# Patient Record
Sex: FEMALE | Race: WHITE | Employment: UNEMPLOYED | URBAN - METROPOLITAN AREA
[De-identification: names, ages, dates, MRNs, and addresses within clinical notes are randomized per-mention and may not be internally consistent; named-entity substitution may affect disease eponyms.]

---

## 2017-03-15 ENCOUNTER — OFFICE VISIT (OUTPATIENT)
Dept: FAMILY MEDICINE CLINIC | Facility: CLINIC | Age: 52
End: 2017-03-15

## 2017-03-15 VITALS
HEIGHT: 64 IN | OXYGEN SATURATION: 98 % | BODY MASS INDEX: 29.02 KG/M2 | DIASTOLIC BLOOD PRESSURE: 84 MMHG | SYSTOLIC BLOOD PRESSURE: 124 MMHG | HEART RATE: 83 BPM | WEIGHT: 170 LBS | RESPIRATION RATE: 16 BRPM | TEMPERATURE: 98 F

## 2017-03-15 DIAGNOSIS — J01.00 ACUTE MAXILLARY SINUSITIS, RECURRENCE NOT SPECIFIED: Primary | ICD-10-CM

## 2017-03-15 DIAGNOSIS — R05.9 COUGH: ICD-10-CM

## 2017-03-15 PROCEDURE — 99213 OFFICE O/P EST LOW 20 MIN: CPT | Performed by: PHYSICIAN ASSISTANT

## 2017-03-15 RX ORDER — BENZONATATE 200 MG/1
200 CAPSULE ORAL 3 TIMES DAILY PRN
Qty: 30 CAPSULE | Refills: 0 | Status: SHIPPED | OUTPATIENT
Start: 2017-03-15 | End: 2017-05-30

## 2017-03-15 RX ORDER — CHOLECALCIFEROL (VITAMIN D3) 125 MCG
500 CAPSULE ORAL DAILY
COMMUNITY
End: 2017-05-30

## 2017-03-15 RX ORDER — CODEINE PHOSPHATE AND GUAIFENESIN 10; 100 MG/5ML; MG/5ML
5 SOLUTION ORAL EVERY 6 HOURS PRN
Qty: 120 ML | Refills: 0 | Status: SHIPPED | OUTPATIENT
Start: 2017-03-15 | End: 2017-05-30

## 2017-03-15 RX ORDER — AMOXICILLIN AND CLAVULANATE POTASSIUM 875; 125 MG/1; MG/1
1 TABLET, FILM COATED ORAL 2 TIMES DAILY
Qty: 20 TABLET | Refills: 0 | Status: SHIPPED | OUTPATIENT
Start: 2017-03-15 | End: 2017-03-25

## 2017-03-15 RX ORDER — FLUTICASONE PROPIONATE 50 MCG
1 SPRAY, SUSPENSION (ML) NASAL DAILY
Qty: 1 BOTTLE | Refills: 1 | Status: SHIPPED | OUTPATIENT
Start: 2017-03-15 | End: 2017-04-14

## 2017-03-15 NOTE — PROGRESS NOTES
HPI:   Camille Mendez is a 47year old female who presents for sinus symptoms for  2  months.  Patient reports congestion, green and yellow, sometimes blood tinged colored nasal discharge, dry cough, cough with green colored sputum, ear pain, sinus ariel CTA, easy breathing, + dry cough  CV: normal S1S2, RRR without murmur     ASSESSMENT AND PLAN:   1. Acute maxillary sinusitis, recurrence not specified  Augmentin twice daily for 10 days. Take with probiotic. Flonase nasal spray daily as directed.  Pt educ

## 2017-05-30 ENCOUNTER — OFFICE VISIT (OUTPATIENT)
Dept: SURGERY | Facility: CLINIC | Age: 52
End: 2017-05-30

## 2017-05-30 VITALS
HEART RATE: 67 BPM | BODY MASS INDEX: 29 KG/M2 | DIASTOLIC BLOOD PRESSURE: 88 MMHG | RESPIRATION RATE: 16 BRPM | WEIGHT: 170 LBS | SYSTOLIC BLOOD PRESSURE: 136 MMHG | TEMPERATURE: 97 F

## 2017-05-30 DIAGNOSIS — R92.1 BREAST CALCIFICATIONS: Primary | ICD-10-CM

## 2017-05-30 PROCEDURE — 99244 OFF/OP CNSLTJ NEW/EST MOD 40: CPT | Performed by: SURGERY

## 2017-05-31 ENCOUNTER — APPOINTMENT (OUTPATIENT)
Dept: LAB | Age: 52
End: 2017-05-31
Attending: INTERNAL MEDICINE
Payer: COMMERCIAL

## 2017-05-31 DIAGNOSIS — R74.8 ELEVATED ALKALINE PHOSPHATASE LEVEL: ICD-10-CM

## 2017-05-31 PROCEDURE — 84075 ASSAY ALKALINE PHOSPHATASE: CPT

## 2017-05-31 PROCEDURE — 82977 ASSAY OF GGT: CPT

## 2017-05-31 PROCEDURE — 36415 COLL VENOUS BLD VENIPUNCTURE: CPT

## 2017-06-05 ENCOUNTER — OFFICE VISIT (OUTPATIENT)
Dept: FAMILY MEDICINE CLINIC | Facility: CLINIC | Age: 52
End: 2017-06-05

## 2017-06-05 VITALS
DIASTOLIC BLOOD PRESSURE: 70 MMHG | RESPIRATION RATE: 16 BRPM | BODY MASS INDEX: 29.18 KG/M2 | HEIGHT: 64.5 IN | TEMPERATURE: 99 F | WEIGHT: 173 LBS | HEART RATE: 74 BPM | OXYGEN SATURATION: 98 % | SYSTOLIC BLOOD PRESSURE: 130 MMHG

## 2017-06-05 DIAGNOSIS — N92.1 IRREGULAR INTERMENSTRUAL BLEEDING: Primary | ICD-10-CM

## 2017-06-05 DIAGNOSIS — R63.5 WEIGHT GAIN: ICD-10-CM

## 2017-06-05 DIAGNOSIS — B37.0 THRUSH: ICD-10-CM

## 2017-06-05 PROCEDURE — 88175 CYTOPATH C/V AUTO FLUID REDO: CPT | Performed by: INTERNAL MEDICINE

## 2017-06-05 PROCEDURE — 87624 HPV HI-RISK TYP POOLED RSLT: CPT | Performed by: INTERNAL MEDICINE

## 2017-06-05 PROCEDURE — 99214 OFFICE O/P EST MOD 30 MIN: CPT | Performed by: INTERNAL MEDICINE

## 2017-06-05 RX ORDER — MEDROXYPROGESTERONE ACETATE 10 MG/1
10 TABLET ORAL DAILY
Qty: 10 TABLET | Refills: 0 | Status: SHIPPED | OUTPATIENT
Start: 2017-06-05 | End: 2017-06-12

## 2017-06-05 RX ORDER — FLUCONAZOLE 100 MG/1
100 TABLET ORAL DAILY
Qty: 10 TABLET | Refills: 0 | Status: SHIPPED | OUTPATIENT
Start: 2017-06-05 | End: 2017-08-22 | Stop reason: ALTCHOICE

## 2017-06-05 NOTE — PROGRESS NOTES
Christopher Funes is a 47year old female. HPI:   Here with spotting since last Fall 2016. Reports seeing Dr. Cristal Colorado after her [address] in 2016, but was told that nothing was concerning since she was still menstruating. Exam was not done per pt.   [facility] warm & dry, tongue with faint white coating on the borders of tongue; no patches on buccal mucosa, no inflammation or edema  NECK: supple,no adenopathy   LUNGS: CTA, easy breathing  CV: normal S1S2, RRR without murmur  GI: good BS's,no masses, HSM or tende

## 2017-06-12 ENCOUNTER — HOSPITAL ENCOUNTER (OUTPATIENT)
Dept: MAMMOGRAPHY | Facility: HOSPITAL | Age: 52
Discharge: HOME | End: 2017-06-12
Attending: SURGERY
Payer: COMMERCIAL

## 2017-06-12 DIAGNOSIS — R92.1 BREAST CALCIFICATIONS: ICD-10-CM

## 2017-06-12 PROCEDURE — 76642 ULTRASOUND BREAST LIMITED: CPT | Performed by: SURGERY

## 2017-06-12 PROCEDURE — G0279 TOMOSYNTHESIS, MAMMO: HCPCS | Performed by: SURGERY

## 2017-06-12 PROCEDURE — 77062 BREAST TOMOSYNTHESIS BI: CPT | Performed by: SURGERY

## 2017-06-12 PROCEDURE — 77066 DX MAMMO INCL CAD BI: CPT | Performed by: SURGERY

## 2017-06-12 RX ORDER — MEDROXYPROGESTERONE ACETATE 10 MG/1
TABLET ORAL
Qty: 10 TABLET | Refills: 0 | Status: SHIPPED | OUTPATIENT
Start: 2017-06-12 | End: 2017-07-10

## 2017-06-13 DIAGNOSIS — R92.1 BREAST CALCIFICATION, LEFT: Primary | ICD-10-CM

## 2017-06-22 ENCOUNTER — HOSPITAL ENCOUNTER (OUTPATIENT)
Dept: MAMMOGRAPHY | Facility: HOSPITAL | Age: 52
Discharge: HOME | End: 2017-06-22
Attending: SURGERY
Payer: COMMERCIAL

## 2017-06-22 DIAGNOSIS — R92.1 BREAST CALCIFICATION, LEFT: ICD-10-CM

## 2017-06-22 PROCEDURE — 88305 TISSUE EXAM BY PATHOLOGIST: CPT | Performed by: SURGERY

## 2017-06-22 PROCEDURE — 19081 BX BREAST 1ST LESION STRTCTC: CPT | Performed by: SURGERY

## 2017-06-22 PROCEDURE — 19082 BX BREAST ADD LESION STRTCTC: CPT | Performed by: SURGERY

## 2017-06-22 RX ORDER — DIAZEPAM 5 MG/1
TABLET ORAL
Status: COMPLETED
Start: 2017-06-22 | End: 2017-06-22

## 2017-06-22 RX ORDER — DIAZEPAM 5 MG/1
5 TABLET ORAL EVERY 6 HOURS PRN
Status: DISCONTINUED | OUTPATIENT
Start: 2017-06-22 | End: 2017-06-26

## 2017-06-22 RX ADMIN — DIAZEPAM 5 MG: 5 TABLET ORAL at 12:51:00

## 2017-06-26 ENCOUNTER — TELEPHONE (OUTPATIENT)
Dept: SURGERY | Facility: CLINIC | Age: 52
End: 2017-06-26

## 2017-06-27 ENCOUNTER — OFFICE VISIT (OUTPATIENT)
Dept: SURGERY | Facility: CLINIC | Age: 52
End: 2017-06-27

## 2017-06-27 VITALS
RESPIRATION RATE: 18 BRPM | BODY MASS INDEX: 29 KG/M2 | OXYGEN SATURATION: 94 % | DIASTOLIC BLOOD PRESSURE: 92 MMHG | SYSTOLIC BLOOD PRESSURE: 139 MMHG | WEIGHT: 169.63 LBS | TEMPERATURE: 98 F | HEART RATE: 76 BPM

## 2017-06-27 DIAGNOSIS — R92.8 ABNORMAL MAMMOGRAM OF BOTH BREASTS: Primary | ICD-10-CM

## 2017-06-27 PROCEDURE — 99214 OFFICE O/P EST MOD 30 MIN: CPT | Performed by: SURGERY

## 2017-07-10 ENCOUNTER — OFFICE VISIT (OUTPATIENT)
Dept: OBGYN CLINIC | Facility: CLINIC | Age: 52
End: 2017-07-10

## 2017-07-10 VITALS
WEIGHT: 171 LBS | SYSTOLIC BLOOD PRESSURE: 120 MMHG | HEIGHT: 64 IN | BODY MASS INDEX: 29.19 KG/M2 | HEART RATE: 88 BPM | DIASTOLIC BLOOD PRESSURE: 70 MMHG

## 2017-07-10 DIAGNOSIS — N92.6 IRREGULAR MENSES: Primary | ICD-10-CM

## 2017-07-10 PROCEDURE — 88305 TISSUE EXAM BY PATHOLOGIST: CPT | Performed by: OBSTETRICS & GYNECOLOGY

## 2017-07-10 PROCEDURE — 58100 BIOPSY OF UTERUS LINING: CPT | Performed by: OBSTETRICS & GYNECOLOGY

## 2017-07-10 NOTE — PROGRESS NOTES
Teresa Gordillo is a 47year old female. HPI:   Patient presents with:  Gyn Problem: consistant bleeding      spotting almost every day it does stop. Has never really stopped having periods.   Saw her primary care doctor with a normal Pap smear and

## 2017-07-10 NOTE — PROGRESS NOTES
Endometrial Biopsy     Pre-Procedure Care:   Consent was obtained. Procedure/risks were explained. Questions were answered. Correct patient was identified. Correct side and site were confirmed.           Indication: Constant spotting        Description

## 2017-07-13 DIAGNOSIS — N95.0 POSTMENOPAUSAL BLEEDING: Primary | ICD-10-CM

## 2017-07-21 ENCOUNTER — TELEPHONE (OUTPATIENT)
Dept: FAMILY MEDICINE CLINIC | Facility: CLINIC | Age: 52
End: 2017-07-21

## 2017-07-21 NOTE — TELEPHONE ENCOUNTER
PT DROPPING FORMS TO BE FILLED OUT FOR BIOMETRIC SCREENING.  PLEASE CALL WHEN READY TO / GIVEN TO EMERITA TRIAGE

## 2017-07-26 NOTE — TELEPHONE ENCOUNTER
Form completed almost fully, await EMERITA signature, inquire pt re: exercise section. Form on EMERITA shelf Triage.

## 2017-07-27 NOTE — PROGRESS NOTES
Breast Surgery Surveillance      History of Present Illness:   Ms. Robert Sctot is a 47year old woman who presents with a personal history of multiple abnormal mammograms in the past she states that her abnormal mammogram history dates back to 2004 benign tissue at all 4 sites. She has been feeling well since the biopsy with mild swelling and discomfort. She denies any fevers or chills. She is here today for evaluation and recommendations for further therapy. History reviewed.  No pertinent p loss.    HEENT:     The patient denies eye irritation, cataracts, redness, glaucoma, yellowing of the eyes, change in vision or color blindness.  The patient denies hearing loss, ringing in the ears, ear drainage, earaches, nasal congestion, nose bleeds, sn sensation/numbness, problems walking, weakness, tingling or burning in hands/feet. There is no history of abusive relationship, bipolar disorder, sleep disturbance, anxiety, depression or feeling of despair. Endocrine:     There is no history of poor/slo in the breast. The axillary tail is normal.    Abdomen: The abdomen is soft, flat and non tender. The liver is not enlarged. There are no palpable masses. Lymph Nodes:   The supraclavicular, axillary and cervical regions are free of significant lymphade

## 2017-07-27 NOTE — PROGRESS NOTES
Breast Surgery New Patient Consultation    This is the first visit for this 47year old woman, referred by Aniyah Alejo, who presents for evaluation of abnormal breast imaging.     History of Present Illness:   Ms. Cora Yuongblood is a 47 year ol LOCALIZATION W/ SPECIMEN 1 SITE RIG*    Gynecological History:  Pt is a   Pt was 24years old at time of first pregnancy. She has cumulative breastfeeding history of 15 months, last unknown time ago.   She achieved menarche at age 14 and LMP     She See history of present illness    Gastrointestinal:     There is no history of difficulty or pain with swallowing,+ reflux symptoms, vomiting, dark or bloody stools, constipation, yellowing of the skin, indigestion, nausea, change in bowel habits, diarrhe enlarged and is without palpable masses/nodules. There are no palpable masses. The trachea is in the midline. Conjunctiva are clear, non-icteric. Chest: The chest expands symmetrically. The lungs are clear to auscultation. Heart:  The rhythm is regula multiple months since the microcalcifications were found bilaterally. We discussed that if these have found to be stable, continued observation may be an option for her.   However, if new lesions are identified or if she is noted to have an increase in rafy

## 2017-08-22 ENCOUNTER — OFFICE VISIT (OUTPATIENT)
Dept: FAMILY MEDICINE CLINIC | Facility: CLINIC | Age: 52
End: 2017-08-22

## 2017-08-22 VITALS
RESPIRATION RATE: 18 BRPM | WEIGHT: 171 LBS | TEMPERATURE: 99 F | DIASTOLIC BLOOD PRESSURE: 86 MMHG | BODY MASS INDEX: 29 KG/M2 | SYSTOLIC BLOOD PRESSURE: 118 MMHG | HEART RATE: 70 BPM

## 2017-08-22 DIAGNOSIS — R59.0 ENLARGED LYMPH NODE IN NECK: Primary | ICD-10-CM

## 2017-08-22 PROCEDURE — 99213 OFFICE O/P EST LOW 20 MIN: CPT | Performed by: INTERNAL MEDICINE

## 2017-08-23 NOTE — PROGRESS NOTES
Josh Givens is a 47year old female. HPI:   Here with lump found on her neck last week. Reports that today it is much smaller than it used to be. Has had recent allergies. No ST. No fever, chills, myalgias or night sweats. No tenderness.     No to the plan. Follow up prn.

## 2017-12-15 ENCOUNTER — HOSPITAL ENCOUNTER (OUTPATIENT)
Dept: MAMMOGRAPHY | Facility: HOSPITAL | Age: 52
Discharge: HOME | End: 2017-12-15
Attending: SURGERY
Payer: COMMERCIAL

## 2017-12-15 DIAGNOSIS — R92.8 ABNORMAL MAMMOGRAM OF BOTH BREASTS: ICD-10-CM

## 2017-12-15 PROCEDURE — 77066 DX MAMMO INCL CAD BI: CPT | Performed by: SURGERY

## 2017-12-15 PROCEDURE — 77062 BREAST TOMOSYNTHESIS BI: CPT | Performed by: SURGERY

## 2017-12-18 DIAGNOSIS — Z12.39 SCREENING FOR BREAST CANCER: Primary | ICD-10-CM

## 2018-07-24 ENCOUNTER — LAB ENCOUNTER (OUTPATIENT)
Dept: LAB | Age: 53
End: 2018-07-24
Attending: INTERNAL MEDICINE
Payer: COMMERCIAL

## 2018-07-24 ENCOUNTER — OFFICE VISIT (OUTPATIENT)
Dept: FAMILY MEDICINE CLINIC | Facility: CLINIC | Age: 53
End: 2018-07-24
Payer: COMMERCIAL

## 2018-07-24 VITALS
RESPIRATION RATE: 16 BRPM | HEIGHT: 64 IN | TEMPERATURE: 98 F | SYSTOLIC BLOOD PRESSURE: 128 MMHG | WEIGHT: 146 LBS | BODY MASS INDEX: 24.92 KG/M2 | DIASTOLIC BLOOD PRESSURE: 86 MMHG | HEART RATE: 88 BPM

## 2018-07-24 DIAGNOSIS — Z01.419 WELL WOMAN EXAM: Primary | ICD-10-CM

## 2018-07-24 DIAGNOSIS — Z01.419 WELL WOMAN EXAM: ICD-10-CM

## 2018-07-24 DIAGNOSIS — Z13.820 SCREENING FOR OSTEOPOROSIS: ICD-10-CM

## 2018-07-24 LAB
ALBUMIN SERPL-MCNC: 3.9 G/DL (ref 3.5–4.8)
ALBUMIN/GLOB SERPL: 1.1 {RATIO} (ref 1–2)
ALP LIVER SERPL-CCNC: 95 U/L (ref 41–108)
ALT SERPL-CCNC: 28 U/L (ref 14–54)
ANION GAP SERPL CALC-SCNC: 7 MMOL/L (ref 0–18)
AST SERPL-CCNC: 28 U/L (ref 15–41)
BASOPHILS # BLD AUTO: 0.04 X10(3) UL (ref 0–0.1)
BASOPHILS NFR BLD AUTO: 0.6 %
BILIRUB SERPL-MCNC: 0.5 MG/DL (ref 0.1–2)
BUN BLD-MCNC: 14 MG/DL (ref 8–20)
BUN/CREAT SERPL: 14.9 (ref 10–20)
CALCIUM BLD-MCNC: 9 MG/DL (ref 8.3–10.3)
CHLORIDE SERPL-SCNC: 107 MMOL/L (ref 101–111)
CHOLEST SMN-MCNC: 181 MG/DL (ref ?–200)
CO2 SERPL-SCNC: 27 MMOL/L (ref 22–32)
CREAT BLD-MCNC: 0.94 MG/DL (ref 0.55–1.02)
EOSINOPHIL # BLD AUTO: 0.08 X10(3) UL (ref 0–0.3)
EOSINOPHIL NFR BLD AUTO: 1.1 %
ERYTHROCYTE [DISTWIDTH] IN BLOOD BY AUTOMATED COUNT: 13.4 % (ref 11.5–16)
GLOBULIN PLAS-MCNC: 3.7 G/DL (ref 2.5–3.7)
GLUCOSE BLD-MCNC: 83 MG/DL (ref 70–99)
HCT VFR BLD AUTO: 42.4 % (ref 34–50)
HDLC SERPL-MCNC: 64 MG/DL (ref 40–59)
HGB BLD-MCNC: 13.8 G/DL (ref 12–16)
IMMATURE GRANULOCYTE COUNT: 0.02 X10(3) UL (ref 0–1)
IMMATURE GRANULOCYTE RATIO %: 0.3 %
LDLC SERPL CALC-MCNC: 109 MG/DL (ref ?–100)
LYMPHOCYTES # BLD AUTO: 2.18 X10(3) UL (ref 0.9–4)
LYMPHOCYTES NFR BLD AUTO: 30.9 %
M PROTEIN MFR SERPL ELPH: 7.6 G/DL (ref 6.1–8.3)
MCH RBC QN AUTO: 30.7 PG (ref 27–33.2)
MCHC RBC AUTO-ENTMCNC: 32.5 G/DL (ref 31–37)
MCV RBC AUTO: 94.2 FL (ref 81–100)
MONOCYTES # BLD AUTO: 0.42 X10(3) UL (ref 0.1–1)
MONOCYTES NFR BLD AUTO: 6 %
NEUTROPHIL ABS PRELIM: 4.31 X10 (3) UL (ref 1.3–6.7)
NEUTROPHILS # BLD AUTO: 4.31 X10(3) UL (ref 1.3–6.7)
NEUTROPHILS NFR BLD AUTO: 61.1 %
NONHDLC SERPL-MCNC: 117 MG/DL (ref ?–130)
OSMOLALITY SERPL CALC.SUM OF ELEC: 292 MOSM/KG (ref 275–295)
PLATELET # BLD AUTO: 203 10(3)UL (ref 150–450)
POTASSIUM SERPL-SCNC: 4.3 MMOL/L (ref 3.6–5.1)
RBC # BLD AUTO: 4.5 X10(6)UL (ref 3.8–5.1)
RED CELL DISTRIBUTION WIDTH-SD: 46.5 FL (ref 35.1–46.3)
SODIUM SERPL-SCNC: 141 MMOL/L (ref 136–144)
TRIGL SERPL-MCNC: 40 MG/DL (ref 30–149)
TSI SER-ACNC: 1.38 MIU/ML (ref 0.35–5.5)
VIT D+METAB SERPL-MCNC: 30.6 NG/ML (ref 30–100)
VLDLC SERPL CALC-MCNC: 8 MG/DL (ref 0–30)
WBC # BLD AUTO: 7.1 X10(3) UL (ref 4–13)

## 2018-07-24 PROCEDURE — 85025 COMPLETE CBC W/AUTO DIFF WBC: CPT

## 2018-07-24 PROCEDURE — 80061 LIPID PANEL: CPT

## 2018-07-24 PROCEDURE — 99396 PREV VISIT EST AGE 40-64: CPT | Performed by: INTERNAL MEDICINE

## 2018-07-24 PROCEDURE — 82306 VITAMIN D 25 HYDROXY: CPT

## 2018-07-24 PROCEDURE — 84443 ASSAY THYROID STIM HORMONE: CPT

## 2018-07-24 PROCEDURE — 36415 COLL VENOUS BLD VENIPUNCTURE: CPT

## 2018-07-24 PROCEDURE — 80053 COMPREHEN METABOLIC PANEL: CPT

## 2018-07-24 NOTE — PROGRESS NOTES
HPI:   Rafal Hagen is a 46year old female who presents for a well woman exam. Symptoms: denies discharge, itching, burning or dysuria, is menopausal, last menses Sept 2017. No LMP recorded.  Patient is not currently having periods (Reason: Rachel Carranza denies vaginal dryness or dyspareunia   MS: denies back pain  NEURO: denies headaches or dizziness  PSYCH: denies depression or anxiety  HEME: denies hx of anemia  ENDOCRINE: denies thyroid history, denies excessive thirst, denies significant weight change W/DIFF      COMP METABOLIC PANEL      Lipid Panel [E]      TSH W REFLEX TO FREE T4      Vit D    .

## 2018-11-01 ENCOUNTER — OFFICE VISIT (OUTPATIENT)
Dept: FAMILY MEDICINE CLINIC | Facility: CLINIC | Age: 53
End: 2018-11-01
Payer: COMMERCIAL

## 2018-11-01 VITALS
HEART RATE: 78 BPM | WEIGHT: 152 LBS | DIASTOLIC BLOOD PRESSURE: 78 MMHG | BODY MASS INDEX: 25.95 KG/M2 | RESPIRATION RATE: 18 BRPM | TEMPERATURE: 98 F | SYSTOLIC BLOOD PRESSURE: 128 MMHG | OXYGEN SATURATION: 98 % | HEIGHT: 64 IN

## 2018-11-01 DIAGNOSIS — R30.9 URINATION PAIN: Primary | ICD-10-CM

## 2018-11-01 DIAGNOSIS — R10.2 PELVIC PAIN: ICD-10-CM

## 2018-11-01 PROCEDURE — 87086 URINE CULTURE/COLONY COUNT: CPT | Performed by: INTERNAL MEDICINE

## 2018-11-01 PROCEDURE — 99214 OFFICE O/P EST MOD 30 MIN: CPT | Performed by: INTERNAL MEDICINE

## 2018-11-01 PROCEDURE — 81003 URINALYSIS AUTO W/O SCOPE: CPT | Performed by: INTERNAL MEDICINE

## 2018-11-07 ENCOUNTER — HOSPITAL ENCOUNTER (OUTPATIENT)
Dept: ULTRASOUND IMAGING | Age: 53
Discharge: HOME | End: 2018-11-07
Attending: INTERNAL MEDICINE
Payer: COMMERCIAL

## 2018-11-07 DIAGNOSIS — R10.2 PELVIC PAIN: ICD-10-CM

## 2018-11-07 PROCEDURE — 76830 TRANSVAGINAL US NON-OB: CPT | Performed by: INTERNAL MEDICINE

## 2018-11-07 PROCEDURE — 76856 US EXAM PELVIC COMPLETE: CPT | Performed by: INTERNAL MEDICINE

## 2018-11-10 NOTE — PROGRESS NOTES
Rafal Hagen is a 46year old female. HPI:   Here with urinary symptoms that she's had for so long she \"doesn't know whats normal anymore\". At least a year of slight burning with urination but never lingers throughout the day, not persistent.   O with moderate palpation, no rebound or rigidity  : No inguinal adenopathy. No genital lesions. Vaginal vault with no discharge. Cervix smooth, no lesions.  Fleshy protrusion thru the cervical os, appears as a prolapse vs large polyp  Bimanual exam + left

## 2018-11-14 ENCOUNTER — OFFICE VISIT (OUTPATIENT)
Dept: OBGYN CLINIC | Facility: CLINIC | Age: 53
End: 2018-11-14
Payer: COMMERCIAL

## 2018-11-14 VITALS
HEART RATE: 88 BPM | WEIGHT: 152 LBS | BODY MASS INDEX: 25.95 KG/M2 | SYSTOLIC BLOOD PRESSURE: 118 MMHG | HEIGHT: 64 IN | DIASTOLIC BLOOD PRESSURE: 64 MMHG

## 2018-11-14 DIAGNOSIS — Z01.419 WELL WOMAN EXAM WITH ROUTINE GYNECOLOGICAL EXAM: Primary | ICD-10-CM

## 2018-11-14 DIAGNOSIS — Z12.39 SCREENING FOR MALIGNANT NEOPLASM OF BREAST: ICD-10-CM

## 2018-11-14 DIAGNOSIS — N76.0 VAGINITIS AND VULVOVAGINITIS: ICD-10-CM

## 2018-11-14 DIAGNOSIS — Z12.4 PAPANICOLAOU SMEAR FOR CERVICAL CANCER SCREENING: ICD-10-CM

## 2018-11-14 PROCEDURE — 57500 BIOPSY OF CERVIX: CPT | Performed by: OBSTETRICS & GYNECOLOGY

## 2018-11-14 PROCEDURE — 88175 CYTOPATH C/V AUTO FLUID REDO: CPT | Performed by: OBSTETRICS & GYNECOLOGY

## 2018-11-14 PROCEDURE — 87624 HPV HI-RISK TYP POOLED RSLT: CPT | Performed by: OBSTETRICS & GYNECOLOGY

## 2018-11-14 PROCEDURE — 99386 PREV VISIT NEW AGE 40-64: CPT | Performed by: OBSTETRICS & GYNECOLOGY

## 2018-11-14 PROCEDURE — 88305 TISSUE EXAM BY PATHOLOGIST: CPT | Performed by: OBSTETRICS & GYNECOLOGY

## 2018-11-14 NOTE — PROGRESS NOTES
Pete Cantrell is a 46year old female  Patient's last menstrual period was 10/11/2016. Patient presents with:  Gyn Problem: u/s in Epic,painful during intercourse, green/brown discharge, more discharge after waking up in morning  .      She has no needs - medical: Not on file      Transportation needs - non-medical: Not on file    Occupational History      Not on file    Tobacco Use      Smoking status: Never Smoker      Smokeless tobacco: Never Used    Substance and Sexual Activity      Alcohol use situation.  Appropriate mood and affect    Pelvic Exam:  External Genitalia: normal appearance, hair distribution, and no lesions  Urethral Meatus:  normal in size, location, without lesions and prolapse  Bladder:  No fullness, masses or tenderness  Vagina:

## 2019-03-26 ENCOUNTER — OFFICE VISIT (OUTPATIENT)
Dept: FAMILY MEDICINE CLINIC | Facility: CLINIC | Age: 54
End: 2019-03-26
Payer: COMMERCIAL

## 2019-03-26 VITALS
TEMPERATURE: 98 F | DIASTOLIC BLOOD PRESSURE: 78 MMHG | SYSTOLIC BLOOD PRESSURE: 128 MMHG | HEIGHT: 64 IN | HEART RATE: 72 BPM | OXYGEN SATURATION: 98 % | RESPIRATION RATE: 18 BRPM | BODY MASS INDEX: 27.14 KG/M2 | WEIGHT: 159 LBS

## 2019-03-26 DIAGNOSIS — N95.2 POST-MENOPAUSE ATROPHIC VAGINITIS: Primary | ICD-10-CM

## 2019-03-26 PROCEDURE — 99214 OFFICE O/P EST MOD 30 MIN: CPT | Performed by: INTERNAL MEDICINE

## 2019-03-26 RX ORDER — ESTRADIOL 0.1 MG/G
CREAM VAGINAL
Qty: 42.5 G | Refills: 3 | Status: SHIPPED | OUTPATIENT
Start: 2019-03-26 | End: 2019-07-25

## 2019-03-26 NOTE — PROGRESS NOTES
Mandy Singleton is a 53year old female. HPI:   Here for vaginal dryness and pain with intercourse only. No pelvic pain  No discharge  Hx of cervical polyp, biopsy negative. Pap up to date.   Tried otc lubricants with no relief    Current Outpatient M Cream      The patient indicates understanding of these issues and agrees to the plan. Follow up Stacey Mendez.

## 2019-05-09 ENCOUNTER — OFFICE VISIT (OUTPATIENT)
Dept: OBGYN CLINIC | Facility: CLINIC | Age: 54
End: 2019-05-09
Payer: COMMERCIAL

## 2019-05-09 VITALS
HEART RATE: 81 BPM | DIASTOLIC BLOOD PRESSURE: 78 MMHG | SYSTOLIC BLOOD PRESSURE: 124 MMHG | WEIGHT: 162 LBS | BODY MASS INDEX: 27.66 KG/M2 | HEIGHT: 64 IN

## 2019-05-09 DIAGNOSIS — Z12.39 SCREENING FOR MALIGNANT NEOPLASM OF BREAST: Primary | ICD-10-CM

## 2019-05-09 DIAGNOSIS — Z01.419 WELL WOMAN EXAM WITH ROUTINE GYNECOLOGICAL EXAM: ICD-10-CM

## 2019-05-09 PROBLEM — N92.6 IRREGULAR MENSES: Status: RESOLVED | Noted: 2017-07-10 | Resolved: 2019-05-09

## 2019-05-09 PROBLEM — N81.11 CYSTOCELE, MIDLINE: Status: ACTIVE | Noted: 2019-05-09

## 2019-05-09 PROCEDURE — 99213 OFFICE O/P EST LOW 20 MIN: CPT | Performed by: OBSTETRICS & GYNECOLOGY

## 2019-05-09 NOTE — PROGRESS NOTES
Sofia Parker is a 53year old female. HPI:   Patient presents with:  Gyn Problem: vaginal pressure, pain during intercourse      She has some bleeding after intercourse her primary care gave her some estrogen cream which is improving things.   She

## 2019-06-05 ENCOUNTER — HOSPITAL ENCOUNTER (OUTPATIENT)
Dept: MAMMOGRAPHY | Age: 54
Discharge: HOME | End: 2019-06-05
Attending: OBSTETRICS & GYNECOLOGY
Payer: COMMERCIAL

## 2019-06-05 DIAGNOSIS — Z12.39 SCREENING FOR MALIGNANT NEOPLASM OF BREAST: ICD-10-CM

## 2019-06-05 PROCEDURE — 77063 BREAST TOMOSYNTHESIS BI: CPT | Performed by: OBSTETRICS & GYNECOLOGY

## 2019-06-05 PROCEDURE — 77067 SCR MAMMO BI INCL CAD: CPT | Performed by: OBSTETRICS & GYNECOLOGY

## 2019-06-20 ENCOUNTER — TELEPHONE (OUTPATIENT)
Dept: OBGYN CLINIC | Facility: CLINIC | Age: 54
End: 2019-06-20

## 2019-06-21 NOTE — TELEPHONE ENCOUNTER
Initial evaluation/Plan of care received via fax. To Dr. Salbador Carranza for review and signature.

## 2019-07-25 ENCOUNTER — OFFICE VISIT (OUTPATIENT)
Dept: FAMILY MEDICINE CLINIC | Facility: CLINIC | Age: 54
End: 2019-07-25
Payer: COMMERCIAL

## 2019-07-25 VITALS
RESPIRATION RATE: 20 BRPM | WEIGHT: 166 LBS | OXYGEN SATURATION: 97 % | SYSTOLIC BLOOD PRESSURE: 122 MMHG | HEART RATE: 74 BPM | DIASTOLIC BLOOD PRESSURE: 82 MMHG | TEMPERATURE: 98 F | HEIGHT: 65 IN | BODY MASS INDEX: 27.66 KG/M2

## 2019-07-25 DIAGNOSIS — Z00.00 ROUTINE GENERAL MEDICAL EXAMINATION AT A HEALTH CARE FACILITY: Primary | ICD-10-CM

## 2019-07-25 DIAGNOSIS — Z13.820 SCREENING FOR OSTEOPOROSIS: ICD-10-CM

## 2019-07-25 PROCEDURE — 99396 PREV VISIT EST AGE 40-64: CPT | Performed by: INTERNAL MEDICINE

## 2019-07-25 NOTE — PROGRESS NOTES
HPI:   Paulette Lomas is a 53year old female who presents for a well woman exam. Symptoms: denies discharge, itching, burning or dysuria, is menopausal.    Patient's last menstrual period was 10/11/2016.   Previous pap: 2018 normal  Performs SBEs:yes headaches or dizziness  PSYCH: denies depression or anxiety  HEME: denies hx of anemia  ENDOCRINE: denies thyroid history, denies excessive thirst, denies significant weight change  ALL/ASTHMA: denies hx of allergy or asthma    EXAM:   /82   Pulse 74

## 2019-07-30 ENCOUNTER — HOSPITAL ENCOUNTER (OUTPATIENT)
Dept: BONE DENSITY | Age: 54
Discharge: HOME | End: 2019-07-30
Attending: INTERNAL MEDICINE
Payer: COMMERCIAL

## 2019-07-30 DIAGNOSIS — Z13.820 SCREENING FOR OSTEOPOROSIS: ICD-10-CM

## 2019-07-30 PROCEDURE — 77080 DXA BONE DENSITY AXIAL: CPT | Performed by: INTERNAL MEDICINE

## 2019-12-02 ENCOUNTER — OFFICE VISIT (OUTPATIENT)
Dept: FAMILY MEDICINE CLINIC | Facility: CLINIC | Age: 54
End: 2019-12-02
Payer: COMMERCIAL

## 2019-12-02 VITALS
BODY MASS INDEX: 22.66 KG/M2 | HEART RATE: 71 BPM | HEIGHT: 65 IN | WEIGHT: 136 LBS | DIASTOLIC BLOOD PRESSURE: 70 MMHG | OXYGEN SATURATION: 98 % | SYSTOLIC BLOOD PRESSURE: 128 MMHG | RESPIRATION RATE: 20 BRPM | TEMPERATURE: 98 F

## 2019-12-02 DIAGNOSIS — N94.10 FEMALE DYSPAREUNIA: ICD-10-CM

## 2019-12-02 DIAGNOSIS — N81.6 RECTOCELE: ICD-10-CM

## 2019-12-02 DIAGNOSIS — L57.4 LAXITY OF SKIN: ICD-10-CM

## 2019-12-02 DIAGNOSIS — N81.11 CYSTOCELE, MIDLINE: Primary | ICD-10-CM

## 2019-12-02 DIAGNOSIS — E65 ABDOMINAL PANNICULUS: ICD-10-CM

## 2019-12-02 PROCEDURE — 99213 OFFICE O/P EST LOW 20 MIN: CPT | Performed by: INTERNAL MEDICINE

## 2019-12-04 NOTE — PROGRESS NOTES
Mandy Singleton is a 53year old female. HPI:   Pt here for follow up GYNE consultation and PT. Treated for cystocele/rectocele/painful intercourse. States PT has improved things a bit but still has discomfort.  Would like a second opinion for treatme Plastics. Abdominal panniculus    No orders of the defined types were placed in this encounter. OBG - INTERNAL  PLASTIC SURGERY - INTERNAL  No orders of the defined types were placed in this encounter.       The patient indicates understanding of these

## 2020-01-13 ENCOUNTER — TELEPHONE (OUTPATIENT)
Dept: FAMILY MEDICINE CLINIC | Facility: CLINIC | Age: 55
End: 2020-01-13

## 2020-01-13 DIAGNOSIS — Z00.00 ROUTINE GENERAL MEDICAL EXAMINATION AT A HEALTH CARE FACILITY: Primary | ICD-10-CM

## 2020-01-13 DIAGNOSIS — Z78.9 KETOGENIC DIET: ICD-10-CM

## 2020-01-24 ENCOUNTER — APPOINTMENT (OUTPATIENT)
Dept: GENERAL RADIOLOGY | Age: 55
End: 2020-01-24
Attending: NURSE PRACTITIONER
Payer: COMMERCIAL

## 2020-01-24 ENCOUNTER — HOSPITAL ENCOUNTER (OUTPATIENT)
Age: 55
Discharge: HOME | End: 2020-01-24
Payer: COMMERCIAL

## 2020-01-24 VITALS
BODY MASS INDEX: 21.66 KG/M2 | DIASTOLIC BLOOD PRESSURE: 68 MMHG | WEIGHT: 130 LBS | HEART RATE: 69 BPM | OXYGEN SATURATION: 97 % | SYSTOLIC BLOOD PRESSURE: 120 MMHG | HEIGHT: 65 IN | RESPIRATION RATE: 18 BRPM | TEMPERATURE: 99 F

## 2020-01-24 DIAGNOSIS — M54.9 UPPER BACK PAIN ON LEFT SIDE: Primary | ICD-10-CM

## 2020-01-24 DIAGNOSIS — M62.838 TRAPEZIUS MUSCLE SPASM: ICD-10-CM

## 2020-01-24 LAB
ATRIAL RATE: 65 BPM
ISTAT TROPONIN: <0.1 NG/ML (ref ?–0.1)
P AXIS: 31 DEGREES
P-R INTERVAL: 122 MS
Q-T INTERVAL: 414 MS
QRS DURATION: 80 MS
QTC CALCULATION (BEZET): 430 MS
R AXIS: 39 DEGREES
T AXIS: 81 DEGREES
VENTRICULAR RATE: 65 BPM

## 2020-01-24 PROCEDURE — 84484 ASSAY OF TROPONIN QUANT: CPT

## 2020-01-24 PROCEDURE — 93010 ELECTROCARDIOGRAM REPORT: CPT

## 2020-01-24 PROCEDURE — 36415 COLL VENOUS BLD VENIPUNCTURE: CPT

## 2020-01-24 PROCEDURE — 99215 OFFICE O/P EST HI 40 MIN: CPT

## 2020-01-24 PROCEDURE — 99205 OFFICE O/P NEW HI 60 MIN: CPT

## 2020-01-24 PROCEDURE — 93005 ELECTROCARDIOGRAM TRACING: CPT

## 2020-01-24 PROCEDURE — 71046 X-RAY EXAM CHEST 2 VIEWS: CPT | Performed by: NURSE PRACTITIONER

## 2020-01-24 RX ORDER — CYCLOBENZAPRINE HCL 10 MG
10 TABLET ORAL 3 TIMES DAILY PRN
Qty: 20 TABLET | Refills: 0 | Status: SHIPPED | OUTPATIENT
Start: 2020-01-24 | End: 2020-01-31

## 2020-01-24 RX ORDER — IBUPROFEN 600 MG/1
600 TABLET ORAL EVERY 8 HOURS PRN
Qty: 30 TABLET | Refills: 0 | Status: SHIPPED | OUTPATIENT
Start: 2020-01-24 | End: 2020-01-31

## 2020-01-24 NOTE — ED PROVIDER NOTES
Patient Seen in: [facility]      History   Patient presents with:  Shoulder Pain    Stated Complaint: LEFT SHOULDER PAIN/STRAIN X 2 WKS    HPI    70-year-old female presents with left upper back pain/shoulder discomfort that rad ED Triage Vitals [01/24/20 8438]   /68   Pulse 69   Resp 18   Temp 98.5 °F (36.9 °C)   Temp src Oral   SpO2 97 %   O2 Device None (Room air)       Current:/68   Pulse 69   Temp 98.5 °F (36.9 °C) (Oral)   Resp 18   Ht 165.1 cm (5' 5\")   Wt 59 k Chest x-ray negative. Troponin negative. Discussed findings with patient. Questions answered at this time. This is likely muscular in nature. MDM     Lab, EKG and chest x-ray are negative. This is likely muscular in nature.   Will try anti-infla

## 2020-03-10 ENCOUNTER — OFFICE VISIT (OUTPATIENT)
Dept: SURGERY | Facility: CLINIC | Age: 55
End: 2020-03-10

## 2020-03-10 VITALS
HEART RATE: 72 BPM | SYSTOLIC BLOOD PRESSURE: 125 MMHG | BODY MASS INDEX: 21.93 KG/M2 | RESPIRATION RATE: 16 BRPM | DIASTOLIC BLOOD PRESSURE: 77 MMHG | HEIGHT: 64.57 IN | TEMPERATURE: 97 F | WEIGHT: 130 LBS | OXYGEN SATURATION: 98 %

## 2020-03-10 DIAGNOSIS — L90.8 PTOSIS OF ABDOMINAL SKIN: Primary | ICD-10-CM

## 2020-03-10 RX ORDER — MULTIVITAMIN
1 TABLET ORAL DAILY
COMMUNITY

## 2020-03-10 NOTE — PATIENT INSTRUCTIONS
Email sent to billing to obtain quote for abdominoplasty and flank/abdominal liposuction.         General anesthesia , 4 hours, quote to be given  for outpatient vs. 23 hour observation and patient can decide if she wants to stay overnight or not based on t

## 2020-03-10 NOTE — CONSULTS
New Patient Consultation    This is the first visit for this 63year old female interested in an abdominoplasty. History of Present Illness: The patient is a 63year old referred by Marylin Temple evaluation for abdominoplasty.   The patient rela Smokeless tobacco: Never Used         Drug use: No           Review of Systems:    General:   The patient denies, fever, chills, night sweats, fatigue, generalized weakness, change in appetite, weight loss, or weight gain. Endocrine:    There is no his intercourse, painful menses, or pregnancy    Musculoskeletal:  The patient denies muscle aches/pain, joint pain, stiff joints, neck pain, back pain or bone pain.     Neurologic:  There is no history of migraines or severe headaches, seizure/epilepsy, speech dehiscence, skin necrosis, injury to intra-abdominal viscera, contour abnormality, umbilical necrosis, nerve injury and subsequent numbness, hypertrophic scarring or keloid, swelling, scar visibility, as well as medical risks including DVT and PE.   We revi

## 2020-04-03 ENCOUNTER — TELEPHONE (OUTPATIENT)
Dept: UROLOGY | Facility: HOSPITAL | Age: 55
End: 2020-04-03

## 2020-04-03 NOTE — TELEPHONE ENCOUNTER
TC to pt  Unable to LM, mailbox was full  Given circumstances surrounding COVID-19 suggesting to conduct this visit virtually.    Will cont to attempt to reach pt

## 2020-04-07 ENCOUNTER — OFFICE VISIT (OUTPATIENT)
Dept: UROLOGY | Facility: HOSPITAL | Age: 55
End: 2020-04-07
Attending: OBSTETRICS & GYNECOLOGY
Payer: COMMERCIAL

## 2020-04-07 VITALS
DIASTOLIC BLOOD PRESSURE: 68 MMHG | SYSTOLIC BLOOD PRESSURE: 106 MMHG | BODY MASS INDEX: 21.93 KG/M2 | WEIGHT: 130 LBS | HEIGHT: 64.57 IN

## 2020-04-07 DIAGNOSIS — N81.84 PELVIC MUSCLE WASTING: ICD-10-CM

## 2020-04-07 DIAGNOSIS — N39.3 FEMALE STRESS INCONTINENCE: Primary | ICD-10-CM

## 2020-04-07 PROCEDURE — 87086 URINE CULTURE/COLONY COUNT: CPT | Performed by: OBSTETRICS & GYNECOLOGY

## 2020-04-07 PROCEDURE — 99212 OFFICE O/P EST SF 10 MIN: CPT

## 2020-04-07 PROCEDURE — 81001 URINALYSIS AUTO W/SCOPE: CPT | Performed by: OBSTETRICS & GYNECOLOGY

## 2020-04-07 NOTE — PROGRESS NOTES
Yris Hastings, DO  4/7/2020     Referred by Dr. Rahul Ornelas  Pt here with self    Patient presents with:  Prolapse: referred by Dr. Rahul Ornelas      HPI:  Rare MATTHIAS  Denies UUI  + prolapse sx, reports bulge & pressure  Worsening over the past few years  Some dyspar Yes  Urge Incontinence: No  Nocturia Frequency: 2  Frequency: 2 - 3 hours  Incomplete emptying: No  Constipation: No  Wears pad day?: 0  Wears Pad Night?: 0  Activities are limited by UI/POP?: Yes  Currently Sexually Active: Yes(avoids due to pain)    Revi floor disorders including, but not limited to, bleeding, infection, pelvic organ damage, recurrent prolapse, recurrent stress incontinence, de jen OAB, pain, sexual dysfunction, voiding dysfunction, long-term catheterization and re-operation.   Post-op res

## 2020-04-07 NOTE — PATIENT INSTRUCTIONS
[facility]  [facility]    Fingertip Application Method for Estrogen Vaginal Cream    1. Wash you hands with soap and water and dry thoroughly.     2.  Squeeze out enough cream from the tube to cover 1/2 of your index fin

## 2020-04-28 ENCOUNTER — OFFICE VISIT (OUTPATIENT)
Dept: UROLOGY | Facility: HOSPITAL | Age: 55
End: 2020-04-28
Attending: OBSTETRICS & GYNECOLOGY
Payer: COMMERCIAL

## 2020-04-28 VITALS
WEIGHT: 130 LBS | HEIGHT: 64 IN | SYSTOLIC BLOOD PRESSURE: 121 MMHG | DIASTOLIC BLOOD PRESSURE: 84 MMHG | BODY MASS INDEX: 22.2 KG/M2

## 2020-04-28 DIAGNOSIS — N39.3 FEMALE STRESS INCONTINENCE: Primary | ICD-10-CM

## 2020-04-28 PROCEDURE — 51797 INTRAABDOMINAL PRESSURE TEST: CPT

## 2020-04-28 PROCEDURE — 51729 CYSTOMETROGRAM W/VP&UP: CPT

## 2020-04-28 PROCEDURE — 51784 ANAL/URINARY MUSCLE STUDY: CPT

## 2020-04-28 PROCEDURE — 51741 ELECTRO-UROFLOWMETRY FIRST: CPT

## 2020-04-28 PROCEDURE — 81002 URINALYSIS NONAUTO W/O SCOPE: CPT

## 2020-04-28 NOTE — PROCEDURES
Patient here for urodynamic testing. Procedure explained and confirmed by patient. See evaluation form for results. Both verbal and written discharge instructions were given.   Patient tolerated procedure well and will follow up with Dr. Yris [employer] 7 / tdoc   Infusion:  Water Rate 50 mL/min  Temp:  Room  Position:  [x]  Sit  []  Stand  []  Supine  First sensation:    73  mL  First desire to void:    119 mL  Strong desire to void:   358 mL  Maximum cystometric capacity:    450mL  Detrusor Activity:  [ AM

## 2020-04-28 NOTE — PATIENT INSTRUCTIONS
[address]  [place] [address]  [facility]: [phone]  FAX: [phone]       Urodynamic Testing Discharge Instructions: There are NO dietary or activity restrictions.   You may resume catheter may stay in place for a week or more after you go home. Where will I go to get the catheter removed? Your catheter will be removed in the office. Please call the office to schedule a voiding trial with the nurse.     How will the catheter be r experience abdominal bloating, pressure or back pain. · You have a fever over 100.5 for 4 hours or above 101.0 anytime. · You have nausea and/or vomiting. · Burning/pain with urination.       MAURO [address] [facility]

## 2020-05-05 ENCOUNTER — VIRTUAL PHONE E/M (OUTPATIENT)
Dept: UROLOGY | Facility: HOSPITAL | Age: 55
End: 2020-05-05
Attending: OBSTETRICS & GYNECOLOGY
Payer: COMMERCIAL

## 2020-05-05 VITALS — BODY MASS INDEX: 22.2 KG/M2 | HEIGHT: 64 IN | WEIGHT: 130 LBS

## 2020-05-05 DIAGNOSIS — N39.3 FEMALE STRESS INCONTINENCE: Primary | ICD-10-CM

## 2020-05-05 NOTE — PATIENT INSTRUCTIONS
Rubina, to schedule surgery, [phone]    [facility] Center[facility] [address] [place] [address]     Post-Operative Guidelines    · AVOID CONSTIPATION - Take Miralax: one capful in water or juice each morning.   You can also take each evening if neede

## 2020-05-05 NOTE — PROGRESS NOTES
Pt presents w/ initial c/o bulge  Urodynamic testing undergone without complication. Results reviewed with patient via telephone  Given circumstances surrounding COVID-19 this visit is being conducted as a televisit with pt's consent.     284/18cc & 430/67

## 2020-07-27 ENCOUNTER — OFFICE VISIT (OUTPATIENT)
Dept: FAMILY MEDICINE CLINIC | Facility: CLINIC | Age: 55
End: 2020-07-27
Payer: COMMERCIAL

## 2020-07-27 ENCOUNTER — LAB ENCOUNTER (OUTPATIENT)
Dept: LAB | Age: 55
End: 2020-07-27
Attending: INTERNAL MEDICINE
Payer: COMMERCIAL

## 2020-07-27 VITALS
SYSTOLIC BLOOD PRESSURE: 122 MMHG | BODY MASS INDEX: 22.2 KG/M2 | RESPIRATION RATE: 18 BRPM | HEIGHT: 64 IN | OXYGEN SATURATION: 98 % | HEART RATE: 58 BPM | DIASTOLIC BLOOD PRESSURE: 60 MMHG | WEIGHT: 130 LBS

## 2020-07-27 DIAGNOSIS — E56.9 VITAMIN DEFICIENCY: ICD-10-CM

## 2020-07-27 DIAGNOSIS — N30.01 ACUTE CYSTITIS WITH HEMATURIA: ICD-10-CM

## 2020-07-27 DIAGNOSIS — Z00.00 ROUTINE GENERAL MEDICAL EXAMINATION AT A HEALTH CARE FACILITY: Primary | ICD-10-CM

## 2020-07-27 DIAGNOSIS — Z12.31 ENCOUNTER FOR SCREENING MAMMOGRAM FOR BREAST CANCER: ICD-10-CM

## 2020-07-27 LAB
ALBUMIN SERPL-MCNC: 4.1 G/DL (ref 3.4–5)
ALBUMIN/GLOB SERPL: 1.2 {RATIO} (ref 1–2)
ALP LIVER SERPL-CCNC: 64 U/L (ref 46–118)
ALT SERPL-CCNC: 21 U/L (ref 13–56)
ANION GAP SERPL CALC-SCNC: 1 MMOL/L (ref 0–18)
AST SERPL-CCNC: 19 U/L (ref 15–37)
BASOPHILS # BLD AUTO: 0.04 X10(3) UL (ref 0–0.2)
BASOPHILS NFR BLD AUTO: 0.7 %
BILIRUB SERPL-MCNC: 0.4 MG/DL (ref 0.1–2)
BUN BLD-MCNC: 18 MG/DL (ref 7–18)
BUN/CREAT SERPL: 20.2 (ref 10–20)
CALCIUM BLD-MCNC: 9.5 MG/DL (ref 8.5–10.1)
CHLORIDE SERPL-SCNC: 106 MMOL/L (ref 98–112)
CHOLEST SMN-MCNC: 229 MG/DL (ref ?–200)
CO2 SERPL-SCNC: 30 MMOL/L (ref 21–32)
CREAT BLD-MCNC: 0.89 MG/DL (ref 0.55–1.02)
DEPRECATED RDW RBC AUTO: 47.4 FL (ref 35.1–46.3)
EOSINOPHIL # BLD AUTO: 0.07 X10(3) UL (ref 0–0.7)
EOSINOPHIL NFR BLD AUTO: 1.2 %
ERYTHROCYTE [DISTWIDTH] IN BLOOD BY AUTOMATED COUNT: 13.5 % (ref 11–15)
GLOBULIN PLAS-MCNC: 3.3 G/DL (ref 2.8–4.4)
GLUCOSE BLD-MCNC: 94 MG/DL (ref 70–99)
HCT VFR BLD AUTO: 40.6 % (ref 35–48)
HDLC SERPL-MCNC: 85 MG/DL (ref 40–59)
HGB BLD-MCNC: 13.3 G/DL (ref 12–16)
IMM GRANULOCYTES # BLD AUTO: 0.01 X10(3) UL (ref 0–1)
IMM GRANULOCYTES NFR BLD: 0.2 %
LDLC SERPL CALC-MCNC: 134 MG/DL (ref ?–100)
LYMPHOCYTES # BLD AUTO: 2.27 X10(3) UL (ref 1–4)
LYMPHOCYTES NFR BLD AUTO: 37.5 %
M PROTEIN MFR SERPL ELPH: 7.4 G/DL (ref 6.4–8.2)
MCH RBC QN AUTO: 31.1 PG (ref 26–34)
MCHC RBC AUTO-ENTMCNC: 32.8 G/DL (ref 31–37)
MCV RBC AUTO: 95.1 FL (ref 80–100)
MONOCYTES # BLD AUTO: 0.33 X10(3) UL (ref 0.1–1)
MONOCYTES NFR BLD AUTO: 5.5 %
MULTISTIX LOT#: NORMAL NUMERIC
NEUTROPHILS # BLD AUTO: 3.33 X10 (3) UL (ref 1.5–7.7)
NEUTROPHILS # BLD AUTO: 3.33 X10(3) UL (ref 1.5–7.7)
NEUTROPHILS NFR BLD AUTO: 54.9 %
NONHDLC SERPL-MCNC: 144 MG/DL (ref ?–130)
OSMOLALITY SERPL CALC.SUM OF ELEC: 286 MOSM/KG (ref 275–295)
PATIENT FASTING Y/N/NP: YES
PATIENT FASTING Y/N/NP: YES
PH, URINE: 5.5 (ref 4.5–8)
PLATELET # BLD AUTO: 198 10(3)UL (ref 150–450)
POTASSIUM SERPL-SCNC: 4.4 MMOL/L (ref 3.5–5.1)
RBC # BLD AUTO: 4.27 X10(6)UL (ref 3.8–5.3)
SODIUM SERPL-SCNC: 137 MMOL/L (ref 136–145)
SPECIFIC GRAVITY: <=1.005 (ref 1–1.03)
TRIGL SERPL-MCNC: 50 MG/DL (ref 30–149)
TSI SER-ACNC: 1.56 MIU/ML (ref 0.36–3.74)
URINE-COLOR: YELLOW
VIT D+METAB SERPL-MCNC: 31.3 NG/ML (ref 30–100)
VLDLC SERPL CALC-MCNC: 10 MG/DL (ref 0–30)
WBC # BLD AUTO: 6.1 X10(3) UL (ref 4–11)

## 2020-07-27 PROCEDURE — 80050 GENERAL HEALTH PANEL: CPT | Performed by: INTERNAL MEDICINE

## 2020-07-27 PROCEDURE — 99396 PREV VISIT EST AGE 40-64: CPT | Performed by: INTERNAL MEDICINE

## 2020-07-27 PROCEDURE — 3078F DIAST BP <80 MM HG: CPT | Performed by: INTERNAL MEDICINE

## 2020-07-27 PROCEDURE — 36415 COLL VENOUS BLD VENIPUNCTURE: CPT | Performed by: INTERNAL MEDICINE

## 2020-07-27 PROCEDURE — 82306 VITAMIN D 25 HYDROXY: CPT | Performed by: INTERNAL MEDICINE

## 2020-07-27 PROCEDURE — 3074F SYST BP LT 130 MM HG: CPT | Performed by: INTERNAL MEDICINE

## 2020-07-27 PROCEDURE — 81003 URINALYSIS AUTO W/O SCOPE: CPT | Performed by: INTERNAL MEDICINE

## 2020-07-27 PROCEDURE — 3008F BODY MASS INDEX DOCD: CPT | Performed by: INTERNAL MEDICINE

## 2020-07-27 PROCEDURE — 87086 URINE CULTURE/COLONY COUNT: CPT | Performed by: INTERNAL MEDICINE

## 2020-07-27 PROCEDURE — 80061 LIPID PANEL: CPT | Performed by: INTERNAL MEDICINE

## 2020-07-27 RX ORDER — CIPROFLOXACIN 250 MG/1
250 TABLET, FILM COATED ORAL 2 TIMES DAILY
Qty: 14 TABLET | Refills: 0 | Status: SHIPPED | OUTPATIENT
Start: 2020-07-27 | End: 2020-08-03

## 2020-07-27 NOTE — PROGRESS NOTES
HPI:   Teresa Gordillo is a 63year old female who presents for a well woman exam. Symptoms: denies discharge, itching, burning or dysuria, is menopausal.    Patient's last menstrual period was 10/11/2016.   Previous pap: 2018 normal  Performs SBEs:yes denies chest pain, pressure or palpitations  GI: denies abdominal pain; denies heartburn, frequent diarrhea or constipation  : denies dysuria, vaginal discharge or itching; denies vaginal dryness or dyspareunia ; + flank pain; + pelvic cramps  MS: denies mammogram for breast cancer  - [facility] YONNY 2D+3D SCREENING BILAT (CPT=77067/92845); Future      Follow up [address] in 1 year. Sheridan Newsome was given an opportunity to ask questions and verbalized understanding of care.         Orders Placed This Encounter      Urine Dip,

## 2021-03-22 ENCOUNTER — HOSPITAL ENCOUNTER (OUTPATIENT)
Dept: MAMMOGRAPHY | Age: 56
Discharge: HOME | End: 2021-03-22
Attending: INTERNAL MEDICINE
Payer: COMMERCIAL

## 2021-03-22 DIAGNOSIS — Z12.31 ENCOUNTER FOR SCREENING MAMMOGRAM FOR BREAST CANCER: ICD-10-CM

## 2021-03-22 PROCEDURE — 77067 SCR MAMMO BI INCL CAD: CPT | Performed by: INTERNAL MEDICINE

## 2021-03-22 PROCEDURE — 77063 BREAST TOMOSYNTHESIS BI: CPT | Performed by: INTERNAL MEDICINE

## 2021-03-28 ENCOUNTER — IMMUNIZATION (OUTPATIENT)
Dept: LAB | Age: 56
End: 2021-03-28

## 2021-03-28 DIAGNOSIS — Z23 NEED FOR VACCINATION: Primary | ICD-10-CM

## 2021-03-28 PROCEDURE — 0001A COVID 19 PFIZER-BIONTECH: CPT

## 2021-03-28 PROCEDURE — 91300 COVID 19 PFIZER-BIONTECH: CPT

## 2021-04-18 ENCOUNTER — IMMUNIZATION (OUTPATIENT)
Dept: LAB | Age: 56
End: 2021-04-18

## 2021-04-18 DIAGNOSIS — Z23 NEED FOR VACCINATION: Primary | ICD-10-CM

## 2021-04-18 PROCEDURE — 0002A COVID 19 PFIZER-BIONTECH: CPT

## 2021-04-18 PROCEDURE — 91300 COVID 19 PFIZER-BIONTECH: CPT

## 2021-05-20 ENCOUNTER — EKG ENCOUNTER (OUTPATIENT)
Dept: LAB | Age: 56
End: 2021-05-20
Attending: INTERNAL MEDICINE
Payer: COMMERCIAL

## 2021-05-20 ENCOUNTER — LAB ENCOUNTER (OUTPATIENT)
Dept: LAB | Age: 56
End: 2021-05-20
Attending: INTERNAL MEDICINE
Payer: COMMERCIAL

## 2021-05-20 ENCOUNTER — OFFICE VISIT (OUTPATIENT)
Dept: FAMILY MEDICINE CLINIC | Facility: CLINIC | Age: 56
End: 2021-05-20
Payer: COMMERCIAL

## 2021-05-20 VITALS
HEIGHT: 64 IN | OXYGEN SATURATION: 97 % | TEMPERATURE: 98 F | HEART RATE: 61 BPM | BODY MASS INDEX: 24.59 KG/M2 | WEIGHT: 144 LBS | DIASTOLIC BLOOD PRESSURE: 68 MMHG | SYSTOLIC BLOOD PRESSURE: 120 MMHG | RESPIRATION RATE: 18 BRPM

## 2021-05-20 DIAGNOSIS — Z01.818 PREOPERATIVE EXAMINATION: Primary | ICD-10-CM

## 2021-05-20 DIAGNOSIS — N81.4 UTEROVAGINAL PROLAPSE: ICD-10-CM

## 2021-05-20 DIAGNOSIS — Z01.818 PREOPERATIVE EXAMINATION: ICD-10-CM

## 2021-05-20 PROCEDURE — 93010 ELECTROCARDIOGRAM REPORT: CPT | Performed by: INTERNAL MEDICINE

## 2021-05-20 PROCEDURE — 3074F SYST BP LT 130 MM HG: CPT | Performed by: INTERNAL MEDICINE

## 2021-05-20 PROCEDURE — 3008F BODY MASS INDEX DOCD: CPT | Performed by: INTERNAL MEDICINE

## 2021-05-20 PROCEDURE — 85025 COMPLETE CBC W/AUTO DIFF WBC: CPT | Performed by: INTERNAL MEDICINE

## 2021-05-20 PROCEDURE — 85652 RBC SED RATE AUTOMATED: CPT | Performed by: INTERNAL MEDICINE

## 2021-05-20 PROCEDURE — 93005 ELECTROCARDIOGRAM TRACING: CPT

## 2021-05-20 PROCEDURE — 3078F DIAST BP <80 MM HG: CPT | Performed by: INTERNAL MEDICINE

## 2021-05-20 PROCEDURE — 99244 OFF/OP CNSLTJ NEW/EST MOD 40: CPT | Performed by: INTERNAL MEDICINE

## 2021-05-20 RX ORDER — ALPRAZOLAM 0.5 MG/1
0.5 TABLET ORAL 3 TIMES DAILY PRN
Qty: 6 TABLET | Refills: 0 | Status: SHIPPED | OUTPATIENT
Start: 2021-05-20 | End: 2021-07-06

## 2021-05-20 NOTE — PROGRESS NOTES
Diana Lynch is a 61year old female who was referred for a pre-operative physical exam and clearance for surgery.   Patient is to have Total Vaginal Hysterectomy with Bilateral Salpingectomy (Torres) UTEROSACRAL VAGINAL VAULT SUSPENSION, ANTERIOR,PO Alcohol use: Yes      Alcohol/week: 4.0 standard drinks      Types: 4 Glasses of wine per week    Drug use: No     Occ: home. : yes. Children: grown sons and 1 dtr  Exercise: 5-6 times per week.   Diet: low carb diet     REVIEW OF SYSTEMS:   GENERAL: presents for a pre-operative physical exam.  Patient is to have Total Vaginal Hysterectomy with Bilateral Salpingectomy (Brian) UTEROSACRAL VAGINAL VAULT SUSPENSION, ANTERIOR,POSTERIOR, ENTERCELE REPAIR, POSSIBLE PLACEMENT OF MID-URETHRAL SLING, CYSTOSCOP

## 2021-06-07 ENCOUNTER — ANESTHESIA EVENT (OUTPATIENT)
Dept: SURGERY | Facility: HOSPITAL | Age: 56
End: 2021-06-07
Payer: COMMERCIAL

## 2021-06-18 ENCOUNTER — LAB ENCOUNTER (OUTPATIENT)
Dept: LAB | Facility: HOSPITAL | Age: 56
End: 2021-06-18
Attending: OBSTETRICS & GYNECOLOGY
Payer: COMMERCIAL

## 2021-06-18 DIAGNOSIS — N39.3 STRESS INCONTINENCE: ICD-10-CM

## 2021-06-21 ENCOUNTER — ANESTHESIA (OUTPATIENT)
Dept: SURGERY | Facility: HOSPITAL | Age: 56
End: 2021-06-21
Payer: COMMERCIAL

## 2021-06-21 ENCOUNTER — HOSPITAL ENCOUNTER (OUTPATIENT)
Facility: HOSPITAL | Age: 56
Discharge: HOME | End: 2021-06-22
Attending: OBSTETRICS & GYNECOLOGY | Admitting: OBSTETRICS & GYNECOLOGY
Payer: COMMERCIAL

## 2021-06-21 DIAGNOSIS — N39.3 STRESS INCONTINENCE: Primary | ICD-10-CM

## 2021-06-21 PROCEDURE — 88305 TISSUE EXAM BY PATHOLOGIST: CPT | Performed by: OBSTETRICS & GYNECOLOGY

## 2021-06-21 PROCEDURE — 0TJB8ZZ INSPECTION OF BLADDER, VIA NATURAL OR ARTIFICIAL OPENING ENDOSCOPIC: ICD-10-PCS | Performed by: OBSTETRICS & GYNECOLOGY

## 2021-06-21 PROCEDURE — 0UT97ZZ RESECTION OF UTERUS, VIA NATURAL OR ARTIFICIAL OPENING: ICD-10-PCS | Performed by: OBSTETRICS & GYNECOLOGY

## 2021-06-21 PROCEDURE — 0WQNXZZ REPAIR FEMALE PERINEUM, EXTERNAL APPROACH: ICD-10-PCS | Performed by: OBSTETRICS & GYNECOLOGY

## 2021-06-21 PROCEDURE — 0JQC0ZZ REPAIR PELVIC REGION SUBCUTANEOUS TISSUE AND FASCIA, OPEN APPROACH: ICD-10-PCS | Performed by: OBSTETRICS & GYNECOLOGY

## 2021-06-21 PROCEDURE — 0TSD4ZZ REPOSITION URETHRA, PERCUTANEOUS ENDOSCOPIC APPROACH: ICD-10-PCS | Performed by: OBSTETRICS & GYNECOLOGY

## 2021-06-21 PROCEDURE — 0UQF0ZZ REPAIR CUL-DE-SAC, OPEN APPROACH: ICD-10-PCS | Performed by: OBSTETRICS & GYNECOLOGY

## 2021-06-21 DEVICE — TRANSVAGINAL MID-URETHRAL SLING
Type: IMPLANTABLE DEVICE | Site: BLADDER | Status: FUNCTIONAL
Brand: ADVANTAGE FIT™ BLUE SYSTEM

## 2021-06-21 RX ORDER — HYDROCODONE BITARTRATE AND ACETAMINOPHEN 5; 325 MG/1; MG/1
2 TABLET ORAL EVERY 4 HOURS PRN
Status: DISCONTINUED | OUTPATIENT
Start: 2021-06-21 | End: 2021-06-22

## 2021-06-21 RX ORDER — ONDANSETRON 2 MG/ML
4 INJECTION INTRAMUSCULAR; INTRAVENOUS EVERY 8 HOURS PRN
Status: DISCONTINUED | OUTPATIENT
Start: 2021-06-21 | End: 2021-06-22

## 2021-06-21 RX ORDER — ROCURONIUM BROMIDE 10 MG/ML
INJECTION, SOLUTION INTRAVENOUS AS NEEDED
Status: DISCONTINUED | OUTPATIENT
Start: 2021-06-21 | End: 2021-06-21 | Stop reason: SURG

## 2021-06-21 RX ORDER — SODIUM CHLORIDE, SODIUM LACTATE, POTASSIUM CHLORIDE, CALCIUM CHLORIDE 600; 310; 30; 20 MG/100ML; MG/100ML; MG/100ML; MG/100ML
INJECTION, SOLUTION INTRAVENOUS CONTINUOUS
Status: DISCONTINUED | OUTPATIENT
Start: 2021-06-21 | End: 2021-06-21 | Stop reason: HOSPADM

## 2021-06-21 RX ORDER — HYDROMORPHONE HYDROCHLORIDE 1 MG/ML
INJECTION, SOLUTION INTRAMUSCULAR; INTRAVENOUS; SUBCUTANEOUS
Status: COMPLETED
Start: 2021-06-21 | End: 2021-06-21

## 2021-06-21 RX ORDER — CEFAZOLIN SODIUM/WATER 2 G/20 ML
2 SYRINGE (ML) INTRAVENOUS ONCE
Status: COMPLETED | OUTPATIENT
Start: 2021-06-21 | End: 2021-06-21

## 2021-06-21 RX ORDER — LABETALOL HYDROCHLORIDE 5 MG/ML
INJECTION, SOLUTION INTRAVENOUS AS NEEDED
Status: DISCONTINUED | OUTPATIENT
Start: 2021-06-21 | End: 2021-06-21 | Stop reason: SURG

## 2021-06-21 RX ORDER — DEXAMETHASONE SODIUM PHOSPHATE 4 MG/ML
VIAL (ML) INJECTION AS NEEDED
Status: DISCONTINUED | OUTPATIENT
Start: 2021-06-21 | End: 2021-06-21 | Stop reason: SURG

## 2021-06-21 RX ORDER — SODIUM CHLORIDE, SODIUM LACTATE, POTASSIUM CHLORIDE, CALCIUM CHLORIDE 600; 310; 30; 20 MG/100ML; MG/100ML; MG/100ML; MG/100ML
INJECTION, SOLUTION INTRAVENOUS CONTINUOUS
Status: DISCONTINUED | OUTPATIENT
Start: 2021-06-21 | End: 2021-06-22

## 2021-06-21 RX ORDER — HYDROCODONE BITARTRATE AND ACETAMINOPHEN 5; 325 MG/1; MG/1
1 TABLET ORAL AS NEEDED
Status: DISCONTINUED | OUTPATIENT
Start: 2021-06-21 | End: 2021-06-21 | Stop reason: HOSPADM

## 2021-06-21 RX ORDER — KETOROLAC TROMETHAMINE 30 MG/ML
30 INJECTION, SOLUTION INTRAMUSCULAR; INTRAVENOUS EVERY 6 HOURS
Status: DISCONTINUED | OUTPATIENT
Start: 2021-06-21 | End: 2021-06-22

## 2021-06-21 RX ORDER — HYDROCODONE BITARTRATE AND ACETAMINOPHEN 5; 325 MG/1; MG/1
1 TABLET ORAL EVERY 4 HOURS PRN
Status: DISCONTINUED | OUTPATIENT
Start: 2021-06-21 | End: 2021-06-22

## 2021-06-21 RX ORDER — SCOLOPAMINE TRANSDERMAL SYSTEM 1 MG/1
PATCH, EXTENDED RELEASE TRANSDERMAL
Status: DISCONTINUED
Start: 2021-06-21 | End: 2021-06-21 | Stop reason: WASHOUT

## 2021-06-21 RX ORDER — ONDANSETRON 2 MG/ML
INJECTION INTRAMUSCULAR; INTRAVENOUS AS NEEDED
Status: DISCONTINUED | OUTPATIENT
Start: 2021-06-21 | End: 2021-06-21 | Stop reason: SURG

## 2021-06-21 RX ORDER — HYDROMORPHONE HYDROCHLORIDE 1 MG/ML
0.8 INJECTION, SOLUTION INTRAMUSCULAR; INTRAVENOUS; SUBCUTANEOUS EVERY 2 HOUR PRN
Status: DISCONTINUED | OUTPATIENT
Start: 2021-06-21 | End: 2021-06-22

## 2021-06-21 RX ORDER — ALPRAZOLAM 0.5 MG/1
0.5 TABLET ORAL 3 TIMES DAILY PRN
Status: DISCONTINUED | OUTPATIENT
Start: 2021-06-21 | End: 2021-06-22

## 2021-06-21 RX ORDER — HYDROMORPHONE HYDROCHLORIDE 1 MG/ML
0.4 INJECTION, SOLUTION INTRAMUSCULAR; INTRAVENOUS; SUBCUTANEOUS EVERY 5 MIN PRN
Status: DISCONTINUED | OUTPATIENT
Start: 2021-06-21 | End: 2021-06-21 | Stop reason: HOSPADM

## 2021-06-21 RX ORDER — ACETAMINOPHEN 500 MG
1000 TABLET ORAL ONCE
Status: DISCONTINUED | OUTPATIENT
Start: 2021-06-21 | End: 2021-06-21

## 2021-06-21 RX ORDER — GLYCOPYRROLATE 0.2 MG/ML
INJECTION, SOLUTION INTRAMUSCULAR; INTRAVENOUS AS NEEDED
Status: DISCONTINUED | OUTPATIENT
Start: 2021-06-21 | End: 2021-06-21 | Stop reason: SURG

## 2021-06-21 RX ORDER — ACETAMINOPHEN 325 MG/1
650 TABLET ORAL EVERY 4 HOURS PRN
Status: DISCONTINUED | OUTPATIENT
Start: 2021-06-21 | End: 2021-06-22

## 2021-06-21 RX ORDER — DEXAMETHASONE SODIUM PHOSPHATE 4 MG/ML
4 VIAL (ML) INJECTION AS NEEDED
Status: DISCONTINUED | OUTPATIENT
Start: 2021-06-21 | End: 2021-06-21 | Stop reason: HOSPADM

## 2021-06-21 RX ORDER — KETOROLAC TROMETHAMINE 30 MG/ML
INJECTION, SOLUTION INTRAMUSCULAR; INTRAVENOUS AS NEEDED
Status: DISCONTINUED | OUTPATIENT
Start: 2021-06-21 | End: 2021-06-21 | Stop reason: SURG

## 2021-06-21 RX ORDER — HYDROMORPHONE HYDROCHLORIDE 1 MG/ML
0.2 INJECTION, SOLUTION INTRAMUSCULAR; INTRAVENOUS; SUBCUTANEOUS EVERY 2 HOUR PRN
Status: DISCONTINUED | OUTPATIENT
Start: 2021-06-21 | End: 2021-06-22

## 2021-06-21 RX ORDER — MIDAZOLAM HYDROCHLORIDE 1 MG/ML
INJECTION INTRAMUSCULAR; INTRAVENOUS AS NEEDED
Status: DISCONTINUED | OUTPATIENT
Start: 2021-06-21 | End: 2021-06-21 | Stop reason: SURG

## 2021-06-21 RX ORDER — METOCLOPRAMIDE HYDROCHLORIDE 5 MG/ML
INJECTION INTRAMUSCULAR; INTRAVENOUS
Status: COMPLETED
Start: 2021-06-21 | End: 2021-06-21

## 2021-06-21 RX ORDER — HYDROMORPHONE HYDROCHLORIDE 1 MG/ML
0.4 INJECTION, SOLUTION INTRAMUSCULAR; INTRAVENOUS; SUBCUTANEOUS EVERY 2 HOUR PRN
Status: DISCONTINUED | OUTPATIENT
Start: 2021-06-21 | End: 2021-06-22

## 2021-06-21 RX ORDER — KETOROLAC TROMETHAMINE 30 MG/ML
30 INJECTION, SOLUTION INTRAMUSCULAR; INTRAVENOUS EVERY 6 HOURS
Status: DISCONTINUED | OUTPATIENT
Start: 2021-06-21 | End: 2021-06-21

## 2021-06-21 RX ORDER — GLYCOPYRROLATE 0.2 MG/ML
0.2 INJECTION, SOLUTION INTRAMUSCULAR; INTRAVENOUS ONCE
Status: COMPLETED | OUTPATIENT
Start: 2021-06-21 | End: 2021-06-21

## 2021-06-21 RX ORDER — NALOXONE HYDROCHLORIDE 0.4 MG/ML
80 INJECTION, SOLUTION INTRAMUSCULAR; INTRAVENOUS; SUBCUTANEOUS AS NEEDED
Status: DISCONTINUED | OUTPATIENT
Start: 2021-06-21 | End: 2021-06-21 | Stop reason: HOSPADM

## 2021-06-21 RX ORDER — CEFAZOLIN SODIUM/WATER 2 G/20 ML
SYRINGE (ML) INTRAVENOUS
Status: DISPENSED
Start: 2021-06-21 | End: 2021-06-21

## 2021-06-21 RX ORDER — GLYCOPYRROLATE 0.2 MG/ML
INJECTION, SOLUTION INTRAMUSCULAR; INTRAVENOUS
Status: COMPLETED
Start: 2021-06-21 | End: 2021-06-21

## 2021-06-21 RX ORDER — ONDANSETRON 4 MG/1
4 TABLET, FILM COATED ORAL EVERY 8 HOURS PRN
Status: DISCONTINUED | OUTPATIENT
Start: 2021-06-21 | End: 2021-06-22

## 2021-06-21 RX ORDER — KETAMINE HYDROCHLORIDE 50 MG/ML
INJECTION, SOLUTION, CONCENTRATE INTRAMUSCULAR; INTRAVENOUS AS NEEDED
Status: DISCONTINUED | OUTPATIENT
Start: 2021-06-21 | End: 2021-06-21 | Stop reason: SURG

## 2021-06-21 RX ORDER — LIDOCAINE HYDROCHLORIDE 10 MG/ML
INJECTION, SOLUTION EPIDURAL; INFILTRATION; INTRACAUDAL; PERINEURAL AS NEEDED
Status: DISCONTINUED | OUTPATIENT
Start: 2021-06-21 | End: 2021-06-21 | Stop reason: SURG

## 2021-06-21 RX ORDER — MIDAZOLAM HYDROCHLORIDE 1 MG/ML
1 INJECTION INTRAMUSCULAR; INTRAVENOUS EVERY 5 MIN PRN
Status: DISCONTINUED | OUTPATIENT
Start: 2021-06-21 | End: 2021-06-21 | Stop reason: HOSPADM

## 2021-06-21 RX ORDER — ONDANSETRON 2 MG/ML
4 INJECTION INTRAMUSCULAR; INTRAVENOUS AS NEEDED
Status: DISCONTINUED | OUTPATIENT
Start: 2021-06-21 | End: 2021-06-21 | Stop reason: HOSPADM

## 2021-06-21 RX ORDER — BUPIVACAINE HYDROCHLORIDE AND EPINEPHRINE 2.5; 5 MG/ML; UG/ML
INJECTION, SOLUTION EPIDURAL; INFILTRATION; INTRACAUDAL; PERINEURAL AS NEEDED
Status: DISCONTINUED | OUTPATIENT
Start: 2021-06-21 | End: 2021-06-21

## 2021-06-21 RX ORDER — HYDROCODONE BITARTRATE AND ACETAMINOPHEN 5; 325 MG/1; MG/1
2 TABLET ORAL AS NEEDED
Status: DISCONTINUED | OUTPATIENT
Start: 2021-06-21 | End: 2021-06-21 | Stop reason: HOSPADM

## 2021-06-21 RX ORDER — NEOSTIGMINE METHYLSULFATE 1 MG/ML
INJECTION INTRAVENOUS AS NEEDED
Status: DISCONTINUED | OUTPATIENT
Start: 2021-06-21 | End: 2021-06-21 | Stop reason: SURG

## 2021-06-21 RX ORDER — METOCLOPRAMIDE HYDROCHLORIDE 5 MG/ML
10 INJECTION INTRAMUSCULAR; INTRAVENOUS AS NEEDED
Status: DISCONTINUED | OUTPATIENT
Start: 2021-06-21 | End: 2021-06-21 | Stop reason: HOSPADM

## 2021-06-21 RX ORDER — ONDANSETRON 2 MG/ML
INJECTION INTRAMUSCULAR; INTRAVENOUS
Status: COMPLETED
Start: 2021-06-21 | End: 2021-06-21

## 2021-06-21 RX ADMIN — DEXAMETHASONE SODIUM PHOSPHATE 8 MG: 4 MG/ML VIAL (ML) INJECTION at 07:49:00

## 2021-06-21 RX ADMIN — MIDAZOLAM HYDROCHLORIDE 2 MG: 1 INJECTION INTRAMUSCULAR; INTRAVENOUS at 07:35:00

## 2021-06-21 RX ADMIN — NEOSTIGMINE METHYLSULFATE 5 MG: 1 INJECTION INTRAVENOUS at 09:23:00

## 2021-06-21 RX ADMIN — ROCURONIUM BROMIDE 50 MG: 10 INJECTION, SOLUTION INTRAVENOUS at 07:38:00

## 2021-06-21 RX ADMIN — ONDANSETRON 4 MG: 2 INJECTION INTRAMUSCULAR; INTRAVENOUS at 08:55:00

## 2021-06-21 RX ADMIN — SODIUM CHLORIDE, SODIUM LACTATE, POTASSIUM CHLORIDE, CALCIUM CHLORIDE: 600; 310; 30; 20 INJECTION, SOLUTION INTRAVENOUS at 09:04:00

## 2021-06-21 RX ADMIN — ROCURONIUM BROMIDE 20 MG: 10 INJECTION, SOLUTION INTRAVENOUS at 08:23:00

## 2021-06-21 RX ADMIN — LIDOCAINE HYDROCHLORIDE 50 MG: 10 INJECTION, SOLUTION EPIDURAL; INFILTRATION; INTRACAUDAL; PERINEURAL at 07:38:00

## 2021-06-21 RX ADMIN — KETOROLAC TROMETHAMINE 30 MG: 30 INJECTION, SOLUTION INTRAMUSCULAR; INTRAVENOUS at 09:11:00

## 2021-06-21 RX ADMIN — KETAMINE HYDROCHLORIDE 20 MG: 50 INJECTION, SOLUTION, CONCENTRATE INTRAMUSCULAR; INTRAVENOUS at 07:38:00

## 2021-06-21 RX ADMIN — CEFAZOLIN SODIUM/WATER 2 G: 2 G/20 ML SYRINGE (ML) INTRAVENOUS at 07:45:00

## 2021-06-21 RX ADMIN — LABETALOL HYDROCHLORIDE 5 MG: 5 INJECTION, SOLUTION INTRAVENOUS at 08:03:00

## 2021-06-21 RX ADMIN — GLYCOPYRROLATE 0.6 MG: 0.2 INJECTION, SOLUTION INTRAMUSCULAR; INTRAVENOUS at 09:23:00

## 2021-06-21 NOTE — ANESTHESIA POSTPROCEDURE EVALUATION
[facility]    Wilmary Scott Patient Status:  Outpatient in a Bed   Age/Gender 61year old female MRN [id number]   [facility] Attending Heena Martinez MD   Hosp Day # 0 PCP Israel Stewart DO       Anesthesia Post-op Note

## 2021-06-21 NOTE — ANESTHESIA PROCEDURE NOTES
Airway  Urgency: elective      General Information and Staff    Patient location during procedure: OR  Anesthesiologist: Cristiana Evans MD  Performed: anesthesiologist     Indications and Patient Condition  Indications for airway management: anesthesia  S

## 2021-06-21 NOTE — INTERVAL H&P NOTE
Pre-op Diagnosis: STRESS INCONTIENCE, UTEROVAGINAL PROLAPSE    The above referenced H&P was reviewed by Fay Valera MD on 6/21/2021, the patient was examined and no significant changes have occurred in the patient's condition since the H&P was perfor

## 2021-06-21 NOTE — ANESTHESIA PREPROCEDURE EVALUATION
PRE-OP EVALUATION    Patient Name: Deni Vyas    Admit Diagnosis: STRESS INCONTIENCE, UTEROVAGINAL PROLAPSE    Pre-op Diagnosis: STRESS INCONTIENCE, UTEROVAGINAL PROLAPSE    Total Vaginal Hysterectomy with Bilateral Salpingectomy (Parmjit Ward tolerance: good     MET: >4                                           Endo/Other    Negative endo/other ROS. Pulmonary    Negative pulmonary ROS.                        Neuro/Psych        (+) anxiety                      Patient modalities, transfusion if needed, etc. Questions answered and options explained. Patient accepts and wishes to proceed. The consent was signed without further questions.     Plan/risks discussed with: patient and spouse                Present on Admission:

## 2021-06-21 NOTE — OPERATIVE REPORT
PRE-OP DIAGNOSIS:  Uterovaginal prolapse; stress incontinence    POST-OP DIAGNOSIS:  Same    PROCEDURE:  Repair of enterocele; uterosacral ligament suspension; anterior colporrhaphy; posterior colporrhaphy; midurethral sling; cystourethroscopy    SURGEON: .25% marcaine with epinepherine. The redundant epithelium was dissected from the underlying endopelvic connective tissue of the bladder and 0 Vicryl suture was then used to plicate the endopelvic connective tissue, obliterating the cystocele.  The excess v were utilized to reconstruct the perineal body. Rectal examination confirmed that none of these sutures had impinged the rectum. 2-0 Vicryl sutures were then used to reapproximate the vaginal epithelium in the midline in a running locking fashion.      In

## 2021-06-21 NOTE — PROGRESS NOTES
Assumed care for patient at 1230. Patient is resting comfortably in bed. Drowsy, alert and oriented x4. Vitals stable, on 3L O2 maintaining O2 sats >90%. . Reports kim pain reduced to 4/10 with dilaudid and ice packs to the area. CLD.  House in place- d

## 2021-06-21 NOTE — OPERATIVE REPORT
[facility]  OBG Post-Operative Note    Wong Simon Patient Status:  Outpatient in a Bed    1965 MRN [id number]   [facility] SUR[facility]ERY Attending Allison Hendrickson MD   Hosp Day # 0 PCP Delia Antoine DO       PRE-OP DIAGNOSIS

## 2021-06-21 NOTE — H&P
60 y/o female with uterovaginal prolapse and stress urinary incontinence for surgical mgmt  Pre operative clearance with Rosie NP, pt seen & examined without changes.   Thorough discussion of surgical risks, benefits, and alternatives including, but The patient is a 74y year old Male complaining of dizziness.

## 2021-06-22 VITALS
TEMPERATURE: 98 F | OXYGEN SATURATION: 92 % | RESPIRATION RATE: 17 BRPM | HEART RATE: 108 BPM | DIASTOLIC BLOOD PRESSURE: 77 MMHG | WEIGHT: 145.5 LBS | BODY MASS INDEX: 24.84 KG/M2 | HEIGHT: 64 IN | SYSTOLIC BLOOD PRESSURE: 137 MMHG

## 2021-06-22 PROCEDURE — 85025 COMPLETE CBC W/AUTO DIFF WBC: CPT | Performed by: OBSTETRICS & GYNECOLOGY

## 2021-06-22 RX ORDER — IBUPROFEN 600 MG/1
600 TABLET ORAL EVERY 6 HOURS PRN
Qty: 30 TABLET | Refills: 3 | Status: SHIPPED | OUTPATIENT
Start: 2021-06-22 | End: 2021-08-24 | Stop reason: ALTCHOICE

## 2021-06-22 RX ORDER — IBUPROFEN 600 MG/1
600 TABLET ORAL EVERY 6 HOURS PRN
Status: DISCONTINUED | OUTPATIENT
Start: 2021-06-22 | End: 2021-06-22

## 2021-06-22 RX ORDER — HYDROCODONE BITARTRATE AND ACETAMINOPHEN 5; 325 MG/1; MG/1
1 TABLET ORAL EVERY 4 HOURS PRN
Qty: 20 TABLET | Refills: 0 | Status: SHIPPED | OUTPATIENT
Start: 2021-06-22 | End: 2021-07-06

## 2021-06-22 NOTE — PLAN OF CARE
Pt a&ox4, VSS, afebrile. Room air. House draining clear yellow urine. IVF infusing. Peripad with small amount of drainage. Pt reporting pain to back - ice / heat packs provided. Toradol administered as scheduled with relief.  Pt reporting intermittent nause

## 2021-06-22 NOTE — PLAN OF CARE
NURSING DISCHARGE NOTE    Discharged Home via Wheelchair. Accompanied by Support staff  Belongings Taken by patient/family.     Pt and  educated on discharge information including new prescriptions, follow up appointments, and ferrera care at home

## 2021-06-22 NOTE — PROGRESS NOTES
61year old y/o female s/p TVH USVVS APE repair, MUS, cysto POD #1  Pt doing well. No complaints. Denies CP or SOB. Pain well controlled with meds. Tolerates PO diet, no N/V. Not yet ambulatory.    Waiting to void    /70 (BP Location: Left arm)   Puls

## 2021-06-23 ENCOUNTER — TELEPHONE (OUTPATIENT)
Dept: UROLOGY | Facility: HOSPITAL | Age: 56
End: 2021-06-23

## 2021-06-23 NOTE — TELEPHONE ENCOUNTER
TC to pt following surgery  Doing well, no complaints  Cath draining clear urine.  Waiting for BM  Pain controlled with PO meds (IBP & norco)  Reviewed bowel mgmt and activity restrictions  Tolerates ambulation  No heavy vaginal bleeding, some spotting  No

## 2021-06-28 ENCOUNTER — PATIENT OUTREACH (OUTPATIENT)
Dept: CASE MANAGEMENT | Age: 56
End: 2021-06-28

## 2021-06-28 NOTE — PROGRESS NOTES
Patient LVM requesting assistance scheduling apt     [address] [facility]  [address]  [facility]  [place] [id number]  Office will contact patient to schedule apt    [address] [facility]

## 2021-06-29 ENCOUNTER — OFFICE VISIT (OUTPATIENT)
Dept: UROLOGY | Facility: HOSPITAL | Age: 56
End: 2021-06-29
Attending: OBSTETRICS & GYNECOLOGY
Payer: COMMERCIAL

## 2021-06-29 ENCOUNTER — TELEPHONE (OUTPATIENT)
Dept: UROLOGY | Facility: HOSPITAL | Age: 56
End: 2021-06-29

## 2021-06-29 VITALS — RESPIRATION RATE: 16 BRPM | TEMPERATURE: 97 F

## 2021-06-29 DIAGNOSIS — N99.89 POSTOPERATIVE RETENTION OF URINE: ICD-10-CM

## 2021-06-29 DIAGNOSIS — R33.8 POSTOPERATIVE RETENTION OF URINE: ICD-10-CM

## 2021-06-29 DIAGNOSIS — Z98.890 POSTOPERATIVE STATE: ICD-10-CM

## 2021-06-29 PROCEDURE — 99212 OFFICE O/P EST SF 10 MIN: CPT

## 2021-06-29 NOTE — PROGRESS NOTES
Called patient to check on voiding status since leving the clinic this morning, asked she call us back with any questions or concerns

## 2021-06-29 NOTE — PROCEDURES
..Patient here for voiding trial.   Procedure Date:   Procedure Name: Anterior/Posterior/Enterocele Repair, Cystoscopy, Lap-assisted Vaginal Hysterectomy, Mid-urethral Sling and Uterosacral Ligament Suspension  Doing well no complaints  BMs regular  Using

## 2021-06-29 NOTE — PATIENT INSTRUCTIONS
You have passed your voiding trial at 8am, pleases try to urinate at 10am, if unable to urinate, try again at noon, if unable to urinate by 1pm please call the office at [phone]

## 2021-06-29 NOTE — TELEPHONE ENCOUNTER
Patient left message -  Voiding trial this am  Voiding well - feels she is emptying bladder  Will call back if needed

## 2021-07-06 ENCOUNTER — OFFICE VISIT (OUTPATIENT)
Dept: UROLOGY | Facility: HOSPITAL | Age: 56
End: 2021-07-06
Attending: OBSTETRICS & GYNECOLOGY
Payer: COMMERCIAL

## 2021-07-06 VITALS — TEMPERATURE: 97 F | WEIGHT: 146 LBS | BODY MASS INDEX: 24.92 KG/M2 | HEIGHT: 64 IN

## 2021-07-06 DIAGNOSIS — Z98.890 POSTOPERATIVE STATE: Primary | ICD-10-CM

## 2021-07-06 PROCEDURE — 99212 OFFICE O/P EST SF 10 MIN: CPT

## 2021-07-06 NOTE — PROGRESS NOTES
She is s/p Post-Op Summary  Procedure Date: 07/21/21  Procedure Name: Uterosacral Ligament Suspension; Anterior/Posterior/Enterocele Repair;Mid-urethral Sling;Cystoscopy (VH with Dr Long Hinojosa)  Post-Op Symptoms: Patient denies pain, MATTHIAS, UUI, prolapse symptoms

## 2021-09-10 ENCOUNTER — TELEPHONE (OUTPATIENT)
Dept: SURGERY | Facility: CLINIC | Age: 56
End: 2021-09-10

## 2021-09-10 ENCOUNTER — TELEPHONE (OUTPATIENT)
Dept: FAMILY MEDICINE CLINIC | Facility: CLINIC | Age: 56
End: 2021-09-10

## 2021-09-10 DIAGNOSIS — G89.29 CHRONIC BILATERAL LOW BACK PAIN WITHOUT SCIATICA: ICD-10-CM

## 2021-09-10 DIAGNOSIS — M54.50 CHRONIC BILATERAL LOW BACK PAIN WITHOUT SCIATICA: ICD-10-CM

## 2021-09-10 DIAGNOSIS — R10.2 PELVIC PRESSURE IN FEMALE: Primary | ICD-10-CM

## 2021-09-10 NOTE — TELEPHONE ENCOUNTER
Pt requesting an order for a physiotherapist     She has seen one in the past at the [facility]     She was told she needed a referral from [facility]     Please advise

## 2021-09-10 NOTE — TELEPHONE ENCOUNTER
Jamie Rodriguez,   See request - patient aware she will be called back Monday. Patient said you referred her to a physiotherapist at [facility]. She has problems in hips, lower back, and bending over since surgery. Please advise.

## 2021-09-10 NOTE — TELEPHONE ENCOUNTER
Returning pt's call, she had some additional questions regarding the cosmetic quote she received last year. Pt wanted to know if there were any other charges besides the ones listed on the quote.  I informed the patient that the quote is the charge for the

## 2021-09-13 ENCOUNTER — TELEPHONE (OUTPATIENT)
Dept: FAMILY MEDICINE CLINIC | Facility: CLINIC | Age: 56
End: 2021-09-13

## 2021-09-13 NOTE — TELEPHONE ENCOUNTER
[facility] was calling requesting order for [facility] be faxed to them at [phone]. Referral in system still showing OPEN.

## 2021-09-16 ENCOUNTER — TELEPHONE (OUTPATIENT)
Dept: UROLOGY | Facility: HOSPITAL | Age: 56
End: 2021-09-16

## 2021-09-16 ENCOUNTER — LAB ENCOUNTER (OUTPATIENT)
Dept: LAB | Age: 56
End: 2021-09-16
Attending: OBSTETRICS & GYNECOLOGY
Payer: COMMERCIAL

## 2021-09-16 DIAGNOSIS — R30.0 DYSURIA: ICD-10-CM

## 2021-09-16 DIAGNOSIS — R30.0 DYSURIA: Primary | ICD-10-CM

## 2021-09-16 PROCEDURE — 87086 URINE CULTURE/COLONY COUNT: CPT

## 2021-09-16 RX ORDER — NITROFURANTOIN 25; 75 MG/1; MG/1
100 CAPSULE ORAL 2 TIMES DAILY
Qty: 14 CAPSULE | Refills: 0 | Status: SHIPPED | OUTPATIENT
Start: 2021-09-16 | End: 2021-09-23

## 2021-09-16 NOTE — TELEPHONE ENCOUNTER
Pt called w/concern for uti. C/o burning and noting blood on toilet paper since yesterday. Pt informed will order ucx to be completed at [facility] lab location and macrobid 100 mg bid x 7days ordered to pt's pharmacy.

## 2021-09-21 ENCOUNTER — OFFICE VISIT (OUTPATIENT)
Dept: UROLOGY | Facility: HOSPITAL | Age: 56
End: 2021-09-21
Attending: OBSTETRICS & GYNECOLOGY
Payer: COMMERCIAL

## 2021-09-21 VITALS — HEIGHT: 64 IN | TEMPERATURE: 97 F | WEIGHT: 149 LBS | BODY MASS INDEX: 25.44 KG/M2

## 2021-09-21 DIAGNOSIS — N89.8 GRANULATION TISSUE OF VAGINA: ICD-10-CM

## 2021-09-21 DIAGNOSIS — Z98.890 POSTOPERATIVE STATE: ICD-10-CM

## 2021-09-21 DIAGNOSIS — N94.10 DYSPAREUNIA, FEMALE: Primary | ICD-10-CM

## 2021-09-21 DIAGNOSIS — N95.2 POSTMENOPAUSAL ATROPHIC VAGINITIS: ICD-10-CM

## 2021-09-21 DIAGNOSIS — N81.84 PELVIC MUSCLE WASTING: ICD-10-CM

## 2021-09-21 PROCEDURE — 99212 OFFICE O/P EST SF 10 MIN: CPT

## 2021-09-21 RX ORDER — ESTRADIOL 0.1 MG/G
CREAM VAGINAL
Qty: 43 G | Refills: 3 | Status: SHIPPED | OUTPATIENT
Start: 2021-09-21

## 2021-09-21 NOTE — PATIENT INSTRUCTIONS
[facility]  [facility]    Fingertip Application Method for Estrogen Vaginal Cream    1. Wash you hands with soap and water and dry thoroughly.     2.  Squeeze out enough cream from the tube to cover 1/2 of your index fin

## 2021-09-21 NOTE — PROGRESS NOTES
She is s/p Post-Op Summary  Procedure Date: 07/21/21  Procedure Name: Vaginal Hysterectomy; Uterosacral Ligament Suspension;  Anterior/Posterior/Enterocele Repair; Mid-urethral Sling; Cystoscopy  Post-Op Symptoms: Bleeding (after intercourse)  Do you feel y

## 2021-10-05 ENCOUNTER — OFFICE VISIT (OUTPATIENT)
Dept: SURGERY | Facility: CLINIC | Age: 56
End: 2021-10-05
Payer: COMMERCIAL

## 2021-10-05 DIAGNOSIS — M62.08 RECTUS DIASTASIS: Primary | ICD-10-CM

## 2021-10-05 NOTE — PROGRESS NOTES
Tarun Peña is a 61year old female who presents today for a follow-up. She relates that since her last visit, she underwent cystocele repair. She relates having gained approximately 20 pounds over the past several months.   She is incident proce

## 2021-10-14 ENCOUNTER — LAB ENCOUNTER (OUTPATIENT)
Dept: LAB | Age: 56
End: 2021-10-14
Attending: INTERNAL MEDICINE
Payer: COMMERCIAL

## 2021-10-14 ENCOUNTER — OFFICE VISIT (OUTPATIENT)
Dept: FAMILY MEDICINE CLINIC | Facility: CLINIC | Age: 56
End: 2021-10-14
Payer: COMMERCIAL

## 2021-10-14 VITALS
OXYGEN SATURATION: 99 % | DIASTOLIC BLOOD PRESSURE: 72 MMHG | HEART RATE: 69 BPM | WEIGHT: 148.81 LBS | BODY MASS INDEX: 25.41 KG/M2 | HEIGHT: 64 IN | SYSTOLIC BLOOD PRESSURE: 110 MMHG | RESPIRATION RATE: 16 BRPM

## 2021-10-14 DIAGNOSIS — M54.50 CHRONIC LEFT-SIDED LOW BACK PAIN WITHOUT SCIATICA: ICD-10-CM

## 2021-10-14 DIAGNOSIS — G89.29 CHRONIC LEFT-SIDED LOW BACK PAIN WITHOUT SCIATICA: ICD-10-CM

## 2021-10-14 DIAGNOSIS — Z00.00 ROUTINE GENERAL MEDICAL EXAMINATION AT A HEALTH CARE FACILITY: Primary | ICD-10-CM

## 2021-10-14 DIAGNOSIS — Z23 NEED FOR INFLUENZA VACCINATION: ICD-10-CM

## 2021-10-14 DIAGNOSIS — Z00.00 ROUTINE GENERAL MEDICAL EXAMINATION AT A HEALTH CARE FACILITY: ICD-10-CM

## 2021-10-14 PROCEDURE — 3074F SYST BP LT 130 MM HG: CPT | Performed by: INTERNAL MEDICINE

## 2021-10-14 PROCEDURE — 90686 IIV4 VACC NO PRSV 0.5 ML IM: CPT | Performed by: INTERNAL MEDICINE

## 2021-10-14 PROCEDURE — 3078F DIAST BP <80 MM HG: CPT | Performed by: INTERNAL MEDICINE

## 2021-10-14 PROCEDURE — 80050 GENERAL HEALTH PANEL: CPT | Performed by: INTERNAL MEDICINE

## 2021-10-14 PROCEDURE — 99396 PREV VISIT EST AGE 40-64: CPT | Performed by: INTERNAL MEDICINE

## 2021-10-14 PROCEDURE — 84439 ASSAY OF FREE THYROXINE: CPT | Performed by: INTERNAL MEDICINE

## 2021-10-14 PROCEDURE — 80061 LIPID PANEL: CPT | Performed by: INTERNAL MEDICINE

## 2021-10-14 PROCEDURE — 90471 IMMUNIZATION ADMIN: CPT | Performed by: INTERNAL MEDICINE

## 2021-10-14 PROCEDURE — 3008F BODY MASS INDEX DOCD: CPT | Performed by: INTERNAL MEDICINE

## 2021-10-14 NOTE — PROGRESS NOTES
HPI:   Christopher Funes is a 61year old female who presents for a well woman exam. Symptoms: denies discharge, itching, burning or dysuria, is S/P ITALO, ovaries preserved. Total vaginal hysterectomy June 2021 for bladder issues.     Patient's last menst Tobacco Use      Smoking status: Never Smoker      Smokeless tobacco: Never Used    Vaping Use      Vaping Use: Never used    Alcohol use: Yes      Alcohol/week: 7.0 standard drinks      Types: 7 Glasses of wine per week      Comment: occ    Drug use:  No pressure, pt points to her posterior pelvis as the area of pain; no left greater trochanter tenderness with pressure; no discomfort with hip rotation, good ROM  EXT: no cyanosis, clubbing or edema  NEURO: A&Ox3, motor and sensory are grossly intact, LEs +2

## 2021-10-18 ENCOUNTER — HOSPITAL ENCOUNTER (OUTPATIENT)
Dept: GENERAL RADIOLOGY | Age: 56
Discharge: HOME | End: 2021-10-18
Attending: INTERNAL MEDICINE
Payer: COMMERCIAL

## 2021-10-18 DIAGNOSIS — G89.29 CHRONIC LEFT-SIDED LOW BACK PAIN WITHOUT SCIATICA: ICD-10-CM

## 2021-10-18 DIAGNOSIS — M54.50 CHRONIC LEFT-SIDED LOW BACK PAIN WITHOUT SCIATICA: ICD-10-CM

## 2021-10-18 PROCEDURE — 73502 X-RAY EXAM HIP UNI 2-3 VIEWS: CPT | Performed by: INTERNAL MEDICINE

## 2021-11-16 ENCOUNTER — OFFICE VISIT (OUTPATIENT)
Dept: SURGERY | Facility: CLINIC | Age: 56
End: 2021-11-16
Payer: COMMERCIAL

## 2021-11-16 VITALS — BODY MASS INDEX: 26 KG/M2 | WEIGHT: 149 LBS

## 2021-11-16 DIAGNOSIS — M62.08 RECTUS DIASTASIS: Primary | ICD-10-CM

## 2021-11-16 NOTE — PROGRESS NOTES
Rekha Thornton is a 61year old female who presents today for a follow-up. She relates that she is currently attempting weight loss.     Physical Examination:   11/16/21  1053   Weight: 67.6 kg (149 lb)     Physical Examination:  Abdomen: A moderate l

## 2021-12-20 ENCOUNTER — OFFICE VISIT (OUTPATIENT)
Dept: FAMILY MEDICINE CLINIC | Facility: CLINIC | Age: 56
End: 2021-12-20
Payer: COMMERCIAL

## 2021-12-20 VITALS
DIASTOLIC BLOOD PRESSURE: 72 MMHG | OXYGEN SATURATION: 98 % | TEMPERATURE: 97 F | HEART RATE: 67 BPM | SYSTOLIC BLOOD PRESSURE: 122 MMHG
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
## 2021-12-20 DIAGNOSIS — L03.113 CELLULITIS OF RIGHT FOREARM: Primary | ICD-10-CM

## 2021-12-20 DIAGNOSIS — S51.851A CAT BITE OF RIGHT FOREARM, INITIAL ENCOUNTER: ICD-10-CM

## 2021-12-20 PROCEDURE — 3074F SYST BP LT 130 MM HG: CPT | Performed by: FAMILY MEDICINE

## 2021-12-20 PROCEDURE — 3078F DIAST BP <80 MM HG: CPT | Performed by: FAMILY MEDICINE

## 2021-12-20 PROCEDURE — 99213 OFFICE O/P EST LOW 20 MIN: CPT | Performed by: FAMILY MEDICINE

## 2021-12-20 RX ORDER — AMOXICILLIN AND CLAVULANATE POTASSIUM 875; 125 MG/1; MG/1
1 TABLET, FILM COATED ORAL 2 TIMES DAILY
Qty: 20 TABLET | Refills: 0 | Status: SHIPPED | OUTPATIENT
Start: 2021-12-20 | End: 2021-12-30

## 2021-12-20 NOTE — PROGRESS NOTES
CHIEF COMPLAINT:   Patient presents with:  Bite: right forearm. HPI:     Tarun Peña is a 61year old female who presents with concerns of skin infection from cat bite that occurred 5 hours ago.  Pt notes that the area on her right forearm is the skin or numbness.     EXAM:   /72 (BP Location: Right arm, Patient Position: Sitting, Cuff Size: adult)   Pulse 67   Temp 97.4 °F (36.3 °C) (Temporal)   LMP 10/11/2016   SpO2 98%   GENERAL: well developed, well nourished,in no apparent distress  S plan.

## 2021-12-20 NOTE — PATIENT INSTRUCTIONS
Take antibiotics with food and plenty of water. Eat yogurt or take probiotic daily. (Ghassanclaudia Stuart is a good example of an OTC probiotic)  Make sure to finish the entire antibiotic treatment. Increase fluids and rest.   Use otc meds as needed for comfort.    War

## 2021-12-22 ENCOUNTER — HOSPITAL ENCOUNTER (EMERGENCY)
Facility: HOSPITAL | Age: 56
Discharge: HOME | End: 2021-12-22
Attending: EMERGENCY MEDICINE
Payer: COMMERCIAL

## 2021-12-22 ENCOUNTER — OFFICE VISIT (OUTPATIENT)
Dept: FAMILY MEDICINE CLINIC | Facility: CLINIC | Age: 56
End: 2021-12-22
Payer: COMMERCIAL

## 2021-12-22 VITALS
HEART RATE: 97 BPM | HEIGHT: 64 IN | OXYGEN SATURATION: 99 % | TEMPERATURE: 97 F | RESPIRATION RATE: 16 BRPM | BODY MASS INDEX: 24.75 KG/M2 | WEIGHT: 145 LBS

## 2021-12-22 VITALS
WEIGHT: 145 LBS | SYSTOLIC BLOOD PRESSURE: 143 MMHG | HEIGHT: 64 IN | BODY MASS INDEX: 24.75 KG/M2 | DIASTOLIC BLOOD PRESSURE: 68 MMHG | HEART RATE: 64 BPM | RESPIRATION RATE: 16 BRPM | TEMPERATURE: 97 F | OXYGEN SATURATION: 97 %

## 2021-12-22 DIAGNOSIS — L03.113 CELLULITIS OF RIGHT UPPER EXTREMITY: ICD-10-CM
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
## 2021-12-22 DIAGNOSIS — Z20.822 ENCOUNTER FOR LABORATORY TESTING FOR COVID-19 VIRUS: Primary | ICD-10-CM

## 2021-12-22 PROCEDURE — 10060 I&D ABSCESS SIMPLE/SINGLE: CPT

## 2021-12-22 PROCEDURE — 87070 CULTURE OTHR SPECIMN AEROBIC: CPT | Performed by: EMERGENCY MEDICINE

## 2021-12-22 PROCEDURE — 99284 EMERGENCY DEPT VISIT MOD MDM: CPT

## 2021-12-22 PROCEDURE — 87205 SMEAR GRAM STAIN: CPT | Performed by: EMERGENCY MEDICINE

## 2021-12-22 PROCEDURE — 87077 CULTURE AEROBIC IDENTIFY: CPT | Performed by: EMERGENCY MEDICINE

## 2021-12-22 PROCEDURE — 3008F BODY MASS INDEX DOCD: CPT | Performed by: NURSE PRACTITIONER

## 2021-12-22 PROCEDURE — 96365 THER/PROPH/DIAG IV INF INIT: CPT

## 2021-12-22 PROCEDURE — 99213 OFFICE O/P EST LOW 20 MIN: CPT | Performed by: NURSE PRACTITIONER

## 2021-12-22 NOTE — ED INITIAL ASSESSMENT (HPI)
Patient presents for evaluation of worsening cellulitis from a cat bite on Monday. She was seen and started on antibiotics on Monday and followed up today for worsening redness. Denies known fever.

## 2021-12-22 NOTE — PROGRESS NOTES
CHIEF COMPLAINT:   Patient presents with:   Other: pt is here for covid test and to check on cat bite      HPI:   Lola Mccabe is a 61year old female who presents for covid test and examination of cat bite that pt was seen for on Monday and placed o Alcohol/week: 7.0 standard drinks      Types: 7 Glasses of wine per week      Comment: occ    Drug use: No        REVIEW OF SYSTEMS:   GENERAL: normal appetite  SKIN: no rashes or abnormal skin lesions  HEENT: See HPI  LUNGS: See HPI  CARDIOVASCULAR: moo of medication explained and discussed. The patient indicates understanding of these issues and agrees to the plan. The patient is asked to f/u with PCP if sx's persist or worsen. There are no Patient Instructions on file for this visit.

## 2021-12-22 NOTE — ED PROVIDER NOTES
Patient Seen in: [facility] Emergency Department      History   Patient presents with:  Cellulitis    Stated Complaint: cellulitis    Subjective:   HPI    79-year-old female here for cat bite.   Patient states this happened about 3 days ago she was s in HPI. Constitutional and vital signs reviewed. All other systems reviewed and negative except as noted above.     Physical Exam     ED Triage Vitals [12/22/21 1205]   /89   Pulse 66   Resp 18   Temp 97 °F (36.1 °C)   Temp src Temporal   SpO2 9

## 2022-03-29 ENCOUNTER — OFFICE VISIT (OUTPATIENT)
Dept: FAMILY MEDICINE CLINIC | Facility: CLINIC | Age: 57
End: 2022-03-29
Payer: COMMERCIAL

## 2022-03-29 VITALS
HEIGHT: 64 IN | SYSTOLIC BLOOD PRESSURE: 134 MMHG | WEIGHT: 155 LBS | BODY MASS INDEX: 26.46 KG/M2 | OXYGEN SATURATION: 98 % | HEART RATE: 86 BPM | RESPIRATION RATE: 16 BRPM | DIASTOLIC BLOOD PRESSURE: 84 MMHG

## 2022-03-29 DIAGNOSIS — Z12.31 ENCOUNTER FOR SCREENING MAMMOGRAM FOR MALIGNANT NEOPLASM OF BREAST: Primary | ICD-10-CM

## 2022-03-29 DIAGNOSIS — L30.9 DERMATITIS: ICD-10-CM

## 2022-03-29 DIAGNOSIS — R09.89 SHALLOW BREATHING: ICD-10-CM

## 2022-03-29 PROCEDURE — 3008F BODY MASS INDEX DOCD: CPT | Performed by: FAMILY MEDICINE

## 2022-03-29 PROCEDURE — 99213 OFFICE O/P EST LOW 20 MIN: CPT | Performed by: FAMILY MEDICINE

## 2022-03-29 PROCEDURE — 3075F SYST BP GE 130 - 139MM HG: CPT | Performed by: FAMILY MEDICINE

## 2022-03-29 PROCEDURE — 3079F DIAST BP 80-89 MM HG: CPT | Performed by: FAMILY MEDICINE

## 2022-03-30 ENCOUNTER — HOSPITAL ENCOUNTER (OUTPATIENT)
Dept: MAMMOGRAPHY | Age: 57
Discharge: HOME | End: 2022-03-30
Attending: FAMILY MEDICINE
Payer: COMMERCIAL

## 2022-03-30 DIAGNOSIS — Z12.31 ENCOUNTER FOR SCREENING MAMMOGRAM FOR MALIGNANT NEOPLASM OF BREAST: ICD-10-CM

## 2022-03-30 PROCEDURE — 77063 BREAST TOMOSYNTHESIS BI: CPT | Performed by: FAMILY MEDICINE

## 2022-03-30 PROCEDURE — 77067 SCR MAMMO BI INCL CAD: CPT | Performed by: FAMILY MEDICINE

## 2022-07-20 ENCOUNTER — TELEPHONE (OUTPATIENT)
Dept: UROLOGY | Facility: CLINIC | Age: 57
End: 2022-07-20

## 2022-07-21 NOTE — TELEPHONE ENCOUNTER
Late entry:  Pt called office with concerns about billing from [facility]. Brian James and our office billing is complete. S/P VH with Dr. Ray Martinez and repairs with Dr. Lindsay Snáchez 6-21-21. Directed pt to [facility] billing. Dr. Lindsay Sánchez aware.
TC to pt  She has questions regarding billing  Directed her to [address] [facility] [employer] billing [phone]  [facility] specific billing concerns need to be directed to [facility] billing
Quality 47: Advance Care Plan: Advance Care Planning discussed and documented; advance care plan or surrogate decision maker documented in the medical record.
Quality 431: Preventive Care And Screening: Unhealthy Alcohol Use - Screening: Patient screened for unhealthy alcohol use using a single question and scores less than 2 times per year
Quality 226: Preventive Care And Screening: Tobacco Use: Screening And Cessation Intervention: Patient screened for tobacco use and is an ex/non-smoker
Detail Level: Detailed

## 2022-10-04 ENCOUNTER — OFFICE VISIT (OUTPATIENT)
Dept: FAMILY MEDICINE CLINIC | Facility: CLINIC | Age: 57
End: 2022-10-04
Payer: COMMERCIAL

## 2022-10-04 VITALS
BODY MASS INDEX: 25.16 KG/M2 | HEIGHT: 65 IN | DIASTOLIC BLOOD PRESSURE: 80 MMHG | TEMPERATURE: 98 F | HEART RATE: 63 BPM | RESPIRATION RATE: 18 BRPM | WEIGHT: 151 LBS | SYSTOLIC BLOOD PRESSURE: 122 MMHG | OXYGEN SATURATION: 97 %

## 2022-10-04 DIAGNOSIS — B49 FUNGAL INFECTION: Primary | ICD-10-CM

## 2022-10-04 PROCEDURE — 99213 OFFICE O/P EST LOW 20 MIN: CPT | Performed by: NURSE PRACTITIONER

## 2022-10-04 PROCEDURE — 3008F BODY MASS INDEX DOCD: CPT | Performed by: NURSE PRACTITIONER

## 2022-10-04 PROCEDURE — 3074F SYST BP LT 130 MM HG: CPT | Performed by: NURSE PRACTITIONER

## 2022-10-04 PROCEDURE — 3079F DIAST BP 80-89 MM HG: CPT | Performed by: NURSE PRACTITIONER

## 2022-10-04 RX ORDER — KETOCONAZOLE 20 MG/G
1 CREAM TOPICAL 2 TIMES DAILY
Qty: 30 G | Refills: 0 | Status: SHIPPED | OUTPATIENT
Start: 2022-10-04 | End: 2022-10-18

## 2023-06-20 ENCOUNTER — LAB ENCOUNTER (OUTPATIENT)
Dept: LAB | Age: 58
End: 2023-06-20
Attending: INTERNAL MEDICINE
Payer: COMMERCIAL

## 2023-06-20 ENCOUNTER — OFFICE VISIT (OUTPATIENT)
Dept: FAMILY MEDICINE CLINIC | Facility: CLINIC | Age: 58
End: 2023-06-20
Payer: COMMERCIAL

## 2023-06-20 VITALS
OXYGEN SATURATION: 98 % | SYSTOLIC BLOOD PRESSURE: 138 MMHG | BODY MASS INDEX: 25.49 KG/M2 | RESPIRATION RATE: 20 BRPM | HEART RATE: 69 BPM | HEIGHT: 65 IN | WEIGHT: 153 LBS | DIASTOLIC BLOOD PRESSURE: 78 MMHG

## 2023-06-20 DIAGNOSIS — Z00.00 ROUTINE GENERAL MEDICAL EXAMINATION AT A HEALTH CARE FACILITY: ICD-10-CM

## 2023-06-20 DIAGNOSIS — Z12.83 SCREENING EXAM FOR SKIN CANCER: ICD-10-CM

## 2023-06-20 DIAGNOSIS — Z00.00 ROUTINE GENERAL MEDICAL EXAMINATION AT A HEALTH CARE FACILITY: Primary | ICD-10-CM

## 2023-06-20 DIAGNOSIS — Z12.31 ENCOUNTER FOR SCREENING MAMMOGRAM FOR BREAST CANCER: ICD-10-CM

## 2023-06-20 LAB
ALBUMIN SERPL-MCNC: 4.1 G/DL (ref 3.4–5)
ALBUMIN/GLOB SERPL: 1.1 {RATIO} (ref 1–2)
ALP LIVER SERPL-CCNC: 92 U/L
ALT SERPL-CCNC: 28 U/L
ANION GAP SERPL CALC-SCNC: 5 MMOL/L (ref 0–18)
AST SERPL-CCNC: 31 U/L (ref 15–37)
BASOPHILS # BLD AUTO: 0.04 X10(3) UL (ref 0–0.2)
BASOPHILS NFR BLD AUTO: 0.6 %
BILIRUB SERPL-MCNC: 0.6 MG/DL (ref 0.1–2)
BUN BLD-MCNC: 13 MG/DL (ref 7–18)
CALCIUM BLD-MCNC: 9.7 MG/DL (ref 8.5–10.1)
CHLORIDE SERPL-SCNC: 105 MMOL/L (ref 98–112)
CHOLEST SERPL-MCNC: 237 MG/DL (ref ?–200)
CO2 SERPL-SCNC: 27 MMOL/L (ref 21–32)
CREAT BLD-MCNC: 0.96 MG/DL
EOSINOPHIL # BLD AUTO: 0.05 X10(3) UL (ref 0–0.7)
EOSINOPHIL NFR BLD AUTO: 0.7 %
ERYTHROCYTE [DISTWIDTH] IN BLOOD BY AUTOMATED COUNT: 13.1 %
FASTING PATIENT LIPID ANSWER: YES
FASTING STATUS PATIENT QL REPORTED: YES
GFR SERPLBLD BASED ON 1.73 SQ M-ARVRAT: 68 ML/MIN/1.73M2 (ref 60–?)
GLOBULIN PLAS-MCNC: 3.7 G/DL (ref 2.8–4.4)
GLUCOSE BLD-MCNC: 78 MG/DL (ref 70–99)
HCT VFR BLD AUTO: 41.9 %
HDLC SERPL-MCNC: 74 MG/DL (ref 40–59)
HGB BLD-MCNC: 13.6 G/DL
IMM GRANULOCYTES # BLD AUTO: 0.02 X10(3) UL (ref 0–1)
IMM GRANULOCYTES NFR BLD: 0.3 %
LDLC SERPL CALC-MCNC: 152 MG/DL (ref ?–100)
LYMPHOCYTES # BLD AUTO: 2.01 X10(3) UL (ref 1–4)
LYMPHOCYTES NFR BLD AUTO: 27.8 %
MCH RBC QN AUTO: 30.1 PG (ref 26–34)
MCHC RBC AUTO-ENTMCNC: 32.5 G/DL (ref 31–37)
MCV RBC AUTO: 92.7 FL
MONOCYTES # BLD AUTO: 0.41 X10(3) UL (ref 0.1–1)
MONOCYTES NFR BLD AUTO: 5.7 %
NEUTROPHILS # BLD AUTO: 4.71 X10 (3) UL (ref 1.5–7.7)
NEUTROPHILS # BLD AUTO: 4.71 X10(3) UL (ref 1.5–7.7)
NEUTROPHILS NFR BLD AUTO: 64.9 %
NONHDLC SERPL-MCNC: 163 MG/DL (ref ?–130)
OSMOLALITY SERPL CALC.SUM OF ELEC: 283 MOSM/KG (ref 275–295)
PLATELET # BLD AUTO: 221 10(3)UL (ref 150–450)
POTASSIUM SERPL-SCNC: 3.9 MMOL/L (ref 3.5–5.1)
PROT SERPL-MCNC: 7.8 G/DL (ref 6.4–8.2)
RBC # BLD AUTO: 4.52 X10(6)UL
SODIUM SERPL-SCNC: 137 MMOL/L (ref 136–145)
T4 FREE SERPL-MCNC: 1 NG/DL (ref 0.8–1.7)
TRIGL SERPL-MCNC: 68 MG/DL (ref 30–149)
TSI SER-ACNC: 1.29 MIU/ML (ref 0.36–3.74)
VIT D+METAB SERPL-MCNC: 40.9 NG/ML (ref 30–100)
VLDLC SERPL CALC-MCNC: 13 MG/DL (ref 0–30)
WBC # BLD AUTO: 7.2 X10(3) UL (ref 4–11)

## 2023-06-20 PROCEDURE — 90750 HZV VACC RECOMBINANT IM: CPT | Performed by: INTERNAL MEDICINE

## 2023-06-20 PROCEDURE — 84439 ASSAY OF FREE THYROXINE: CPT | Performed by: INTERNAL MEDICINE

## 2023-06-20 PROCEDURE — 3008F BODY MASS INDEX DOCD: CPT | Performed by: INTERNAL MEDICINE

## 2023-06-20 PROCEDURE — 3078F DIAST BP <80 MM HG: CPT | Performed by: INTERNAL MEDICINE

## 2023-06-20 PROCEDURE — 82306 VITAMIN D 25 HYDROXY: CPT | Performed by: INTERNAL MEDICINE

## 2023-06-20 PROCEDURE — 90471 IMMUNIZATION ADMIN: CPT | Performed by: INTERNAL MEDICINE

## 2023-06-20 PROCEDURE — 80050 GENERAL HEALTH PANEL: CPT | Performed by: INTERNAL MEDICINE

## 2023-06-20 PROCEDURE — 3075F SYST BP GE 130 - 139MM HG: CPT | Performed by: INTERNAL MEDICINE

## 2023-06-20 PROCEDURE — 80061 LIPID PANEL: CPT | Performed by: INTERNAL MEDICINE

## 2023-06-20 PROCEDURE — 99396 PREV VISIT EST AGE 40-64: CPT | Performed by: INTERNAL MEDICINE

## 2023-06-25 DIAGNOSIS — E78.00 HIGH CHOLESTEROL: Primary | ICD-10-CM

## 2023-08-17 ENCOUNTER — HOSPITAL ENCOUNTER (OUTPATIENT)
Dept: MAMMOGRAPHY | Facility: HOSPITAL | Age: 58
Discharge: HOME | End: 2023-08-17
Attending: INTERNAL MEDICINE
Payer: COMMERCIAL

## 2023-08-17 DIAGNOSIS — Z12.31 ENCOUNTER FOR SCREENING MAMMOGRAM FOR BREAST CANCER: ICD-10-CM

## 2023-08-17 PROCEDURE — 77067 SCR MAMMO BI INCL CAD: CPT | Performed by: INTERNAL MEDICINE

## 2023-08-17 PROCEDURE — 77063 BREAST TOMOSYNTHESIS BI: CPT | Performed by: INTERNAL MEDICINE

## 2023-09-27 ENCOUNTER — NURSE ONLY (OUTPATIENT)
Dept: FAMILY MEDICINE CLINIC | Facility: CLINIC | Age: 58
End: 2023-09-27
Payer: COMMERCIAL

## 2023-09-27 PROCEDURE — 90750 HZV VACC RECOMBINANT IM: CPT | Performed by: FAMILY MEDICINE

## 2023-09-27 PROCEDURE — 90471 IMMUNIZATION ADMIN: CPT | Performed by: FAMILY MEDICINE

## 2024-07-09 ENCOUNTER — OFFICE VISIT (OUTPATIENT)
Dept: FAMILY MEDICINE CLINIC | Facility: CLINIC | Age: 59
End: 2024-07-09
Payer: COMMERCIAL

## 2024-07-09 ENCOUNTER — LAB ENCOUNTER (OUTPATIENT)
Dept: LAB | Age: 59
End: 2024-07-09
Attending: INTERNAL MEDICINE
Payer: COMMERCIAL

## 2024-07-09 VITALS
RESPIRATION RATE: 20 BRPM | WEIGHT: 156 LBS | SYSTOLIC BLOOD PRESSURE: 112 MMHG | HEART RATE: 68 BPM | DIASTOLIC BLOOD PRESSURE: 80 MMHG | OXYGEN SATURATION: 98 % | BODY MASS INDEX: 25.99 KG/M2 | HEIGHT: 65 IN

## 2024-07-09 DIAGNOSIS — E56.9 HYPOVITAMINOSIS: ICD-10-CM

## 2024-07-09 DIAGNOSIS — M25.552 BILATERAL HIP PAIN: ICD-10-CM

## 2024-07-09 DIAGNOSIS — Z12.31 ENCOUNTER FOR SCREENING MAMMOGRAM FOR BREAST CANCER: ICD-10-CM

## 2024-07-09 DIAGNOSIS — Z00.00 ROUTINE GENERAL MEDICAL EXAMINATION AT A HEALTH CARE FACILITY: Primary | ICD-10-CM

## 2024-07-09 DIAGNOSIS — Z12.11 SCREENING FOR MALIGNANT NEOPLASM OF COLON: ICD-10-CM

## 2024-07-09 DIAGNOSIS — M54.50 ACUTE BILATERAL LOW BACK PAIN WITHOUT SCIATICA: ICD-10-CM

## 2024-07-09 DIAGNOSIS — R05.3 PERSISTENT DRY COUGH: ICD-10-CM

## 2024-07-09 DIAGNOSIS — M25.551 BILATERAL HIP PAIN: ICD-10-CM

## 2024-07-09 DIAGNOSIS — Z00.00 ROUTINE GENERAL MEDICAL EXAMINATION AT A HEALTH CARE FACILITY: ICD-10-CM

## 2024-07-09 LAB
ALBUMIN SERPL-MCNC: 4 G/DL (ref 3.4–5)
ALBUMIN/GLOB SERPL: 1.1 {RATIO} (ref 1–2)
ALP LIVER SERPL-CCNC: 98 U/L
ALT SERPL-CCNC: 29 U/L
ANION GAP SERPL CALC-SCNC: 7 MMOL/L (ref 0–18)
AST SERPL-CCNC: 26 U/L (ref 15–37)
BASOPHILS # BLD AUTO: 0.05 X10(3) UL (ref 0–0.2)
BASOPHILS NFR BLD AUTO: 0.8 %
BILIRUB SERPL-MCNC: 0.5 MG/DL (ref 0.1–2)
BUN BLD-MCNC: 11 MG/DL (ref 9–23)
CALCIUM BLD-MCNC: 9.6 MG/DL (ref 8.5–10.1)
CHLORIDE SERPL-SCNC: 107 MMOL/L (ref 98–112)
CHOLEST SERPL-MCNC: 253 MG/DL (ref ?–200)
CO2 SERPL-SCNC: 25 MMOL/L (ref 21–32)
CREAT BLD-MCNC: 1.07 MG/DL
DEPRECATED HBV CORE AB SER IA-ACNC: 98 NG/ML
EGFRCR SERPLBLD CKD-EPI 2021: 59 ML/MIN/1.73M2 (ref 60–?)
EOSINOPHIL # BLD AUTO: 0.09 X10(3) UL (ref 0–0.7)
EOSINOPHIL NFR BLD AUTO: 1.4 %
ERYTHROCYTE [DISTWIDTH] IN BLOOD BY AUTOMATED COUNT: 13.5 %
FASTING PATIENT LIPID ANSWER: YES
FASTING STATUS PATIENT QL REPORTED: YES
GLOBULIN PLAS-MCNC: 3.7 G/DL (ref 2.8–4.4)
GLUCOSE BLD-MCNC: 81 MG/DL (ref 70–99)
HCT VFR BLD AUTO: 40.7 %
HDLC SERPL-MCNC: 66 MG/DL (ref 40–59)
HGB BLD-MCNC: 13.4 G/DL
IMM GRANULOCYTES # BLD AUTO: 0.02 X10(3) UL (ref 0–1)
IMM GRANULOCYTES NFR BLD: 0.3 %
LDLC SERPL CALC-MCNC: 176 MG/DL (ref ?–100)
LYMPHOCYTES # BLD AUTO: 2.41 X10(3) UL (ref 1–4)
LYMPHOCYTES NFR BLD AUTO: 36.2 %
MCH RBC QN AUTO: 30.7 PG (ref 26–34)
MCHC RBC AUTO-ENTMCNC: 32.9 G/DL (ref 31–37)
MCV RBC AUTO: 93.3 FL
MONOCYTES # BLD AUTO: 0.4 X10(3) UL (ref 0.1–1)
MONOCYTES NFR BLD AUTO: 6 %
NEUTROPHILS # BLD AUTO: 3.69 X10 (3) UL (ref 1.5–7.7)
NEUTROPHILS # BLD AUTO: 3.69 X10(3) UL (ref 1.5–7.7)
NEUTROPHILS NFR BLD AUTO: 55.3 %
NONHDLC SERPL-MCNC: 187 MG/DL (ref ?–130)
OSMOLALITY SERPL CALC.SUM OF ELEC: 286 MOSM/KG (ref 275–295)
PLATELET # BLD AUTO: 220 10(3)UL (ref 150–450)
POTASSIUM SERPL-SCNC: 4.1 MMOL/L (ref 3.5–5.1)
PROT SERPL-MCNC: 7.7 G/DL (ref 6.4–8.2)
RBC # BLD AUTO: 4.36 X10(6)UL
SODIUM SERPL-SCNC: 139 MMOL/L (ref 136–145)
TRIGL SERPL-MCNC: 67 MG/DL (ref 30–149)
TSI SER-ACNC: 1.95 MIU/ML (ref 0.36–3.74)
VIT B12 SERPL-MCNC: 313 PG/ML (ref 193–986)
VIT D+METAB SERPL-MCNC: 26.5 NG/ML (ref 30–100)
VLDLC SERPL CALC-MCNC: 13 MG/DL (ref 0–30)
WBC # BLD AUTO: 6.7 X10(3) UL (ref 4–11)

## 2024-07-09 PROCEDURE — 82728 ASSAY OF FERRITIN: CPT | Performed by: INTERNAL MEDICINE

## 2024-07-09 PROCEDURE — 80061 LIPID PANEL: CPT | Performed by: INTERNAL MEDICINE

## 2024-07-09 PROCEDURE — 80050 GENERAL HEALTH PANEL: CPT | Performed by: INTERNAL MEDICINE

## 2024-07-09 PROCEDURE — 82306 VITAMIN D 25 HYDROXY: CPT | Performed by: INTERNAL MEDICINE

## 2024-07-09 PROCEDURE — 82607 VITAMIN B-12: CPT | Performed by: INTERNAL MEDICINE

## 2024-07-09 RX ORDER — MONTELUKAST SODIUM 10 MG/1
10 TABLET ORAL NIGHTLY
Qty: 90 TABLET | Refills: 0 | Status: SHIPPED | OUTPATIENT
Start: 2024-07-09

## 2024-07-09 NOTE — PROGRESS NOTES
HPI:   Sherlyn Farnsworth is a 59 year old female who presents for a well woman exam. Symptoms: is menopausal, ITALO + ovaries present.    Patient's last menstrual period was 10/11/2016.  Previous pap: 2021, appt coming up  Performs SBEs:yes  Colonoscopy: utd                   C/o Low back and bilateral hip pain- changes from side to side. Sometimes toes go numb. Since then, not exercising or walking because it hurts.  Nothing makes the pain better or worse.   Not associated with injury.  Walking hurts.     C/o dry cough.  May have been sick prior to the cough. Cough isn't worse but not going away. No shortness of breath. No wheezing. No chest tightness.  Nothing makes it better or worse.  No tobacco hx.       No supplements- previously taking [employer] vitamins but didn't notice a difference and stopped them.   No current outpatient medications on file.      Past Medical History:    Papanicolaou smear of cervix with atypical squamous cells of undetermined significance (ASC-US)      Past Surgical History:   Procedure Laterality Date    Anterior repair  07/21/2021    Breast surgery procedure unlisted  06/01/2009    left breast biopsy    Breast surgery procedure unlisted  01/01/2005    right, lumpectomy    Enterocele repair  07/21/2021    Hysterectomy      Dimitris biopsy stereo nodule 1 site right (cpt=19081) Right 06/2017    benign    Dimitris biopsy stereo nodule 2 site bilat (cpt=19081/41241)  2009    Dimitris biopsy stereo nodule 2 site bilat (cpt=19081/80598)  2014    fibrocystic changes    Dimitris biopsy stereo nodule 2 site left (cpt=19081/20857) Left 06/2017    benign    Dimitris localization wire 1 site right (cpt=19281) Right 2004    benign.    Midurethral sling  07/21/2021    Posterior repair  07/21/2021    Total abdom hysterectomy      Uterosacral ligament suspension  07/21/2021    Vaginal hysterectomy  06/2021    Dr. Torres,   Total vaginal hysterectomy      Family History   Problem Relation Age of Onset    Other (leukemia) Mother        Social History:   Social History     Socioeconomic History    Marital status:    Tobacco Use    Smoking status: Never    Smokeless tobacco: Never   Vaping Use    Vaping status: Never Used   Substance and Sexual Activity    Alcohol use: Yes     Alcohol/week: 7.0 standard drinks of alcohol     Types: 7 Glasses of wine per week     Comment: occ    Drug use: No   Other Topics Concern    Caffeine Concern Yes    Exercise Yes     Exercise:  not currently due to pain .  Diet:  well balanced     REVIEW OF SYSTEMS:   GENERAL: feels well otherwise  SKIN: denies unusual skin lesions  HEENT:no vision or hearing changes; denies nasal congestion, sinus pain or sore throat  LUNGS: denies shortness of breath, chest heaviness or wheezing; + frequent dry cough as above  CV: denies chest pain, pressure or palpitations  GI: denies abdominal pain; denies heartburn, frequent diarrhea or constipation  : denies dysuria, vaginal discharge or itching; denies vaginal dryness or dyspareunia   MS: + back pain, bilateral hip pain, hurts to touch or lay on her sides; pain doesn't travel, no radicular pain  NEURO: denies headaches  no dizziness  PSYCH: denies depression or anxiety  HEME: denies hx of anemia  ENDOCRINE: denies thyroid history, denies excessive thirst, denies significant weight change  ALL/ASTHMA: denies hx of  allergy or asthma    EXAM:   /80   Pulse 68   Resp 20   Ht 5' 5\" (1.651 m)   Wt 156 lb (70.8 kg)   LMP 10/11/2016   SpO2 98%   BMI 25.96 kg/m²       Body mass index is 25.96 kg/m².      GENERAL: pleasant female in no apparent distress  SKIN: warm and dry  HEENT: atraumatic, normocephalic; PERRLA, conjunctiva clear, ears and throat are clear  NECK: supple,no adenopathy,no thyromegaly  BREASTS: no retractions, no suspicious masses, no discharge or axillary lymphadenopathy  LUNGS: clear to auscultation, easy breathing  CV: normal S1S2, RRR without murmur  GI: BSs present, no tenderness or  organomegaly  :no adenopathy; no pelvic TTP  MS: Allison, no bony deformities; no lumbar spinal TTP; no lumbar paraspinal TTP, no deformity or protrusion of spinal processes; LE strength 5+  bilaterally against resistance; LEs intact to light sensation; slow to change position; right greater trochanter + TTP, negative bilateral hip internal/external rotation; gait steady  EXT: no cyanosis, clubbing or edema  NEURO: A&Ox3, motor and sensory are grossly intact; good coordination, no tremor    ASSESSMENT AND PLAN:    1. Routine general medical examination at a health care facility  Reinforced routine SBEs. Discussed the benefits of routine exercise, a heart healthy diet and annual flu vaccines.  - CBC With Differential With Platelet; Future  - Comp Metabolic Panel (14); Future  - Lipid Panel; Future  - TSH W Reflex To Free T4; Future  - Vitamin B12; Future  - Vitamin D; Future  - Ferritin [E]; Future    2. Screening for malignant neoplasm of colon  [facility]    3. Hypovitaminosis  - Vitamin D; Future    4. Encounter for screening mammogram for breast cancer  - [facility] YONNY 2D+3D SCREENING BILAT (CPT=77067/52473); Future    5. Acute bilateral low back pain without sciatica  - gentle massage, mobic as directed, consider PT  - XR LUMBAR SPINE (MIN 4 VIEWS) (CPT=72110); Future    6. Bilateral hip pain  - ? Right bursitis, away XR first  - cold compressed  - XR LUMBAR SPINE (MIN 4 VIEWS) (CPT=72110); Future  - XR HIP W OR WO PELVIS 3 OR 4 VIEWS, BILAT(CPT=73522); Future    7. Persistent dry cough  - montelukast 10 MG Oral Tab; Take 1 tablet (10 mg total) by mouth nightly.  Dispense: 90 tablet; Refill: 0  - consider adding LABA if montelukast alone is not helpful  - Imaging if not better in 6-8 weeks, pt agrees              Follow up WWE in 1 year.  Sherlyn was given an opportunity to ask questions and verbalized understanding of care.          .

## 2024-07-10 ENCOUNTER — HOSPITAL ENCOUNTER (OUTPATIENT)
Dept: GENERAL RADIOLOGY | Facility: HOSPITAL | Age: 59
Discharge: HOME | End: 2024-07-10
Attending: INTERNAL MEDICINE
Payer: COMMERCIAL

## 2024-07-10 DIAGNOSIS — M25.552 BILATERAL HIP PAIN: ICD-10-CM

## 2024-07-10 DIAGNOSIS — M25.551 BILATERAL HIP PAIN: ICD-10-CM

## 2024-07-10 DIAGNOSIS — M54.50 ACUTE BILATERAL LOW BACK PAIN WITHOUT SCIATICA: ICD-10-CM

## 2024-07-10 PROCEDURE — 73522 X-RAY EXAM HIPS BI 3-4 VIEWS: CPT | Performed by: INTERNAL MEDICINE

## 2024-07-10 PROCEDURE — 72110 X-RAY EXAM L-2 SPINE 4/>VWS: CPT | Performed by: INTERNAL MEDICINE

## 2024-07-15 DIAGNOSIS — M25.552 CHRONIC PAIN OF BOTH HIPS: ICD-10-CM

## 2024-07-15 DIAGNOSIS — M25.551 CHRONIC PAIN OF BOTH HIPS: ICD-10-CM

## 2024-07-15 DIAGNOSIS — M51.36 DDD (DEGENERATIVE DISC DISEASE), LUMBAR: Primary | ICD-10-CM

## 2024-07-15 DIAGNOSIS — G89.29 CHRONIC PAIN OF BOTH HIPS: ICD-10-CM

## 2024-07-15 RX ORDER — MELOXICAM 15 MG/1
15 TABLET ORAL DAILY
Qty: 30 TABLET | Refills: 1 | Status: SHIPPED | OUTPATIENT
Start: 2024-07-15

## 2024-07-18 ENCOUNTER — PATIENT MESSAGE (OUTPATIENT)
Dept: FAMILY MEDICINE CLINIC | Facility: CLINIC | Age: 59
End: 2024-07-18

## 2024-07-18 DIAGNOSIS — E78.00 HIGH CHOLESTEROL: Primary | ICD-10-CM

## 2024-07-18 NOTE — TELEPHONE ENCOUNTER
From: Sherlyn Farnsworth  To: Lillian Velez  Sent: 7/18/2024 11:11 AM CDT  Subject: Cholesterol Medication    You can send in the prescription to start these meds. I will change my diet and start exercising with the goal to get off the meds.     What are the next steps for the lower back physio?    Thank you.   Sherlyn

## 2024-07-20 RX ORDER — ROSUVASTATIN CALCIUM 5 MG/1
5 TABLET, COATED ORAL NIGHTLY
Qty: 30 TABLET | Refills: 2 | Status: SHIPPED | OUTPATIENT
Start: 2024-07-20

## 2024-08-19 ENCOUNTER — HOSPITAL ENCOUNTER (OUTPATIENT)
Dept: MAMMOGRAPHY | Facility: HOSPITAL | Age: 59
Discharge: HOME | End: 2024-08-19
Attending: INTERNAL MEDICINE
Payer: COMMERCIAL

## 2024-08-19 DIAGNOSIS — Z12.31 ENCOUNTER FOR SCREENING MAMMOGRAM FOR BREAST CANCER: ICD-10-CM

## 2024-08-19 PROCEDURE — 77063 BREAST TOMOSYNTHESIS BI: CPT | Performed by: INTERNAL MEDICINE

## 2024-08-19 PROCEDURE — 77067 SCR MAMMO BI INCL CAD: CPT | Performed by: INTERNAL MEDICINE

## 2024-09-03 PROBLEM — Z12.11 SPECIAL SCREENING FOR MALIGNANT NEOPLASM OF COLON: Status: ACTIVE | Noted: 2024-09-03

## 2024-10-09 DIAGNOSIS — R05.3 PERSISTENT DRY COUGH: ICD-10-CM

## 2024-10-09 RX ORDER — MONTELUKAST SODIUM 10 MG/1
10 TABLET ORAL NIGHTLY
Qty: 90 TABLET | Refills: 0 | Status: SHIPPED | OUTPATIENT
Start: 2024-10-09

## 2024-11-25 ENCOUNTER — OFFICE VISIT (OUTPATIENT)
Dept: FAMILY MEDICINE CLINIC | Facility: CLINIC | Age: 59
End: 2024-11-25
Payer: COMMERCIAL

## 2024-11-25 VITALS
OXYGEN SATURATION: 97 % | RESPIRATION RATE: 18 BRPM | WEIGHT: 154 LBS | BODY MASS INDEX: 25.66 KG/M2 | HEIGHT: 65 IN | DIASTOLIC BLOOD PRESSURE: 80 MMHG | SYSTOLIC BLOOD PRESSURE: 130 MMHG | HEART RATE: 64 BPM

## 2024-11-25 DIAGNOSIS — Z00.00 ROUTINE GENERAL MEDICAL EXAMINATION AT A HEALTH CARE FACILITY: Primary | ICD-10-CM

## 2024-11-25 DIAGNOSIS — M51.369 DEGENERATION OF INTERVERTEBRAL DISC OF LUMBAR REGION, UNSPECIFIED WHETHER PAIN PRESENT: ICD-10-CM

## 2024-11-25 PROCEDURE — 99213 OFFICE O/P EST LOW 20 MIN: CPT | Performed by: INTERNAL MEDICINE

## 2024-11-25 PROCEDURE — 3008F BODY MASS INDEX DOCD: CPT | Performed by: INTERNAL MEDICINE

## 2024-11-25 PROCEDURE — 3075F SYST BP GE 130 - 139MM HG: CPT | Performed by: INTERNAL MEDICINE

## 2024-11-25 PROCEDURE — 3079F DIAST BP 80-89 MM HG: CPT | Performed by: INTERNAL MEDICINE

## 2024-11-25 RX ORDER — MONTELUKAST SODIUM 10 MG/1
10 TABLET ORAL NIGHTLY
Qty: 90 TABLET | Refills: 0 | Status: SHIPPED | OUTPATIENT
Start: 2024-11-25

## 2024-11-25 NOTE — PROGRESS NOTES
HPI:   Sherlyn Farnsworth is a 59 year old female who presents for a well woman exam. Symptoms: {MPN GYNE ROS:14701}.    Patient's last menstrual period was 10/11/2016.  Previous pap: ***  Performs SBEs:***  Colonoscopy: ***                     Current Outpatient Medications   Medication Sig Dispense Refill    MONTELUKAST 10 MG Oral Tab TAKE 1 TABLET(10 MG) BY MOUTH EVERY NIGHT 90 tablet 0    rosuvastatin 5 MG Oral Tab Take 1 tablet (5 mg total) by mouth nightly. 30 tablet 2    Meloxicam 15 MG Oral Tab Take 1 tablet (15 mg total) by mouth daily. 30 tablet 1      Past Medical History:    Arthritis    Age related    Back pain    Age related    Easy bruising    Always    Night sweats    Age related    Pain in joints    Age related    Papanicolaou smear of cervix with atypical squamous cells of undetermined significance (ASC-US)    Sleep disturbance    Age related    Wears glasses      Past Surgical History:   Procedure Laterality Date    Anterior repair  2021    Enterocele repair  2021    Hysterectomy      Dimitris biopsy stereo w/calc 1 site left (cpt=19081)  2014    Radial scar    Dimitris biopsy stereo w/calc 2 site left (cpt=19081/16697) Left 2017    Both benign    Dmiitris biopsy stereo w/calc 2 site right (cpt=19081/72374) Right 2017    Both benign    Dimitris localization wire 1 site right (cpt=19281) Right     benign.    Midurethral sling  2021    Posterior repair  2021    Total abdom hysterectomy      Uterosacral ligament suspension  2021    Vaginal hysterectomy  2021    Dr. Torres,   Total vaginal hysterectomy      Family History   Problem Relation Age of Onset    Other (leukemia) Mother     Crohn's Disease Son         Since age 18      Social History:   Social History     Socioeconomic History    Marital status:    Tobacco Use    Smoking status: Former     Current packs/day: 0.00     Types: Cigarettes     Quit date: 1988     Years since quittin.9    Smokeless tobacco:  Never   Vaping Use    Vaping status: Never Used   Substance and Sexual Activity    Alcohol use: Yes     Alcohol/week: 4.0 standard drinks of alcohol     Types: 4 Glasses of wine per week     Comment: occ    Drug use: Never   Other Topics Concern    Caffeine Concern Yes    Exercise Yes     Exercise:  yes .  Diet:  balanced     REVIEW OF SYSTEMS:   GENERAL: feels well otherwise  SKIN: denies unusual skin lesions  HEENT:no vision or hearing changes; denies nasal congestion, sinus pain or sore throat  LUNGS: denies shortness of breath, chest heaviness or cough  CV: denies chest pain, pressure or palpitations  GI: denies abdominal pain; denies heartburn, frequent diarrhea or constipation  : denies dysuria, vaginal discharge or itching; denies vaginal dryness or dyspareunia   MS: denies back pain  NEURO: denies headaches  no dizziness  PSYCH: denies depression or anxiety  HEME: denies hx of anemia  ENDOCRINE: denies thyroid history, denies excessive thirst, denies significant weight change  ALL/ASTHMA: denies hx of  allergy or asthma    EXAM:   /80   Pulse 64   Resp 18   Ht 5' 5\" (1.651 m)   Wt 154 lb (69.9 kg)   LMP 10/11/2016   SpO2 97%   BMI 25.63 kg/m²       Body mass index is 25.63 kg/m².      GENERAL: pleasant female in no apparent distress  SKIN: warm and dry  HEENT: atraumatic, normocephalic; PERRLA, conjunctiva clear, ears and throat are clear  NECK: supple,no adenopathy,***thyromegaly  BREASTS: no retractions, no suspicious masses, no discharge or axillary lymphadenopathy  LUNGS: clear to auscultation, easy breathing  CV: normal S1S2, RRR without murmur  GI: BSs present, no tenderness or organomegaly  :no genital lesions, introitus is normal, *** discharge,smooth cervix, no adnexal masses or tenderness  MS: Allison, no bony deformities  EXT: no cyanosis, clubbing or edema  NEURO: A&Ox3, motor and sensory are grossly intact; good coordination, no tremor    ASSESSMENT AND PLAN:   There are no  diagnoses linked to this encounter.      Follow up WWE in 1 year.  Sherlyn was given an opportunity to ask questions and verbalized understanding of care.          .

## 2024-12-06 ENCOUNTER — LAB ENCOUNTER (OUTPATIENT)
Dept: LAB | Age: 59
End: 2024-12-06
Attending: INTERNAL MEDICINE
Payer: COMMERCIAL

## 2024-12-06 ENCOUNTER — TELEPHONE (OUTPATIENT)
Dept: ORTHOPEDICS CLINIC | Facility: CLINIC | Age: 59
End: 2024-12-06

## 2024-12-06 DIAGNOSIS — Z00.00 ROUTINE GENERAL MEDICAL EXAMINATION AT A HEALTH CARE FACILITY: ICD-10-CM

## 2024-12-06 DIAGNOSIS — E78.00 HIGH CHOLESTEROL: ICD-10-CM

## 2024-12-06 LAB
ALBUMIN SERPL-MCNC: 4.7 G/DL (ref 3.2–4.8)
ALBUMIN/GLOB SERPL: 1.5 {RATIO} (ref 1–2)
ALP LIVER SERPL-CCNC: 88 U/L
ALT SERPL-CCNC: 22 U/L
ANION GAP SERPL CALC-SCNC: 9 MMOL/L (ref 0–18)
AST SERPL-CCNC: 33 U/L (ref ?–34)
BASOPHILS # BLD AUTO: 0.03 X10(3) UL (ref 0–0.2)
BASOPHILS NFR BLD AUTO: 0.5 %
BILIRUB SERPL-MCNC: 0.6 MG/DL (ref 0.2–1.1)
BUN BLD-MCNC: 19 MG/DL (ref 9–23)
CALCIUM BLD-MCNC: 10.2 MG/DL (ref 8.7–10.4)
CHLORIDE SERPL-SCNC: 102 MMOL/L (ref 98–112)
CHOLEST SERPL-MCNC: 221 MG/DL (ref ?–200)
CO2 SERPL-SCNC: 28 MMOL/L (ref 21–32)
CREAT BLD-MCNC: 1.05 MG/DL
EGFRCR SERPLBLD CKD-EPI 2021: 61 ML/MIN/1.73M2 (ref 60–?)
EOSINOPHIL # BLD AUTO: 0.07 X10(3) UL (ref 0–0.7)
EOSINOPHIL NFR BLD AUTO: 1.2 %
ERYTHROCYTE [DISTWIDTH] IN BLOOD BY AUTOMATED COUNT: 13.2 %
FASTING PATIENT LIPID ANSWER: YES
FASTING STATUS PATIENT QL REPORTED: YES
GLOBULIN PLAS-MCNC: 3.1 G/DL (ref 2–3.5)
GLUCOSE BLD-MCNC: 94 MG/DL (ref 70–99)
HCT VFR BLD AUTO: 41.2 %
HDLC SERPL-MCNC: 66 MG/DL (ref 40–59)
HGB BLD-MCNC: 13.7 G/DL
IMM GRANULOCYTES # BLD AUTO: 0.01 X10(3) UL (ref 0–1)
IMM GRANULOCYTES NFR BLD: 0.2 %
LDLC SERPL CALC-MCNC: 147 MG/DL (ref ?–100)
LYMPHOCYTES # BLD AUTO: 2.18 X10(3) UL (ref 1–4)
LYMPHOCYTES NFR BLD AUTO: 37.7 %
MCH RBC QN AUTO: 30.8 PG (ref 26–34)
MCHC RBC AUTO-ENTMCNC: 33.3 G/DL (ref 31–37)
MCV RBC AUTO: 92.6 FL
MONOCYTES # BLD AUTO: 0.36 X10(3) UL (ref 0.1–1)
MONOCYTES NFR BLD AUTO: 6.2 %
NEUTROPHILS # BLD AUTO: 3.13 X10 (3) UL (ref 1.5–7.7)
NEUTROPHILS # BLD AUTO: 3.13 X10(3) UL (ref 1.5–7.7)
NEUTROPHILS NFR BLD AUTO: 54.2 %
NONHDLC SERPL-MCNC: 155 MG/DL (ref ?–130)
OSMOLALITY SERPL CALC.SUM OF ELEC: 290 MOSM/KG (ref 275–295)
PLATELET # BLD AUTO: 226 10(3)UL (ref 150–450)
POTASSIUM SERPL-SCNC: 4 MMOL/L (ref 3.5–5.1)
PROT SERPL-MCNC: 7.8 G/DL (ref 5.7–8.2)
RBC # BLD AUTO: 4.45 X10(6)UL
SODIUM SERPL-SCNC: 139 MMOL/L (ref 136–145)
TRIGL SERPL-MCNC: 45 MG/DL (ref 30–149)
TSI SER-ACNC: 2.12 UIU/ML (ref 0.55–4.78)
VIT B12 SERPL-MCNC: 344 PG/ML (ref 211–911)
VIT D+METAB SERPL-MCNC: 27.4 NG/ML (ref 30–100)
VLDLC SERPL CALC-MCNC: 8 MG/DL (ref 0–30)
WBC # BLD AUTO: 5.8 X10(3) UL (ref 4–11)

## 2024-12-06 PROCEDURE — 84443 ASSAY THYROID STIM HORMONE: CPT

## 2024-12-06 PROCEDURE — 80061 LIPID PANEL: CPT

## 2024-12-06 PROCEDURE — 82607 VITAMIN B-12: CPT

## 2024-12-06 PROCEDURE — 36415 COLL VENOUS BLD VENIPUNCTURE: CPT

## 2024-12-06 PROCEDURE — 82306 VITAMIN D 25 HYDROXY: CPT

## 2024-12-06 PROCEDURE — 80053 COMPREHEN METABOLIC PANEL: CPT

## 2024-12-06 PROCEDURE — 85025 COMPLETE CBC W/AUTO DIFF WBC: CPT

## 2024-12-06 NOTE — TELEPHONE ENCOUNTER
Patient is seeing Dr. Dale for lower lumbar pain. Please advise if imaging is needed.   Future Appointments   Date Time Provider Department Center   12/12/2024 11:40 AM Raghavendra Dale MD [facility]

## 2024-12-10 NOTE — PROGRESS NOTES
Sherlyn Farnsworth is a 59 year old female.  HPI:   Here for back pain.  Seen earlier for LBP, not better but not worse.  No radicular symptoms.  XR previously showed DDD lumbar spine.    Current Outpatient Medications   Medication Sig Dispense Refill    montelukast 10 MG Oral Tab Take 1 tablet (10 mg total) by mouth nightly. 90 tablet 0    rosuvastatin 5 MG Oral Tab Take 1 tablet (5 mg total) by mouth nightly. 30 tablet 2    Meloxicam 15 MG Oral Tab Take 1 tablet (15 mg total) by mouth daily. 30 tablet 1      Past Medical History:    Arthritis    Age related    Back pain    Age related    Easy bruising    Always    Night sweats    Age related    Pain in joints    Age related    Papanicolaou smear of cervix with atypical squamous cells of undetermined significance (ASC-US)    Sleep disturbance    Age related    Wears glasses      Social History:  Social History     Socioeconomic History    Marital status:    Tobacco Use    Smoking status: Former     Current packs/day: 0.00     Types: Cigarettes     Quit date: 1988     Years since quittin.9    Smokeless tobacco: Never   Vaping Use    Vaping status: Never Used   Substance and Sexual Activity    Alcohol use: Yes     Alcohol/week: 4.0 standard drinks of alcohol     Types: 4 Glasses of wine per week     Comment: occ    Drug use: Never   Other Topics Concern    Caffeine Concern Yes    Exercise Yes        REVIEW OF SYSTEMS:   GENERAL HEALTH: feels well otherwise  SKIN: denies any unusual skin lesions or rashes  RESPIRATORY: denies shortness of breath or cough  CARDIOVASCULAR: denies chest pain or pressure  GI: denies N/V/D; denies abdominal pain    : denies dysuria, frequency or incontinence  NEURO: denies headaches, denies dizziness; denies numbness or tingling    EXAM:   /80   Pulse 64   Resp 18   Ht 5' 5\" (1.651 m)   Wt 154 lb (69.9 kg)   LMP 10/11/2016   SpO2 97%   BMI 25.63 kg/m²   GENERAL: well developed, well nourished,in no apparent  distress  SKIN: warm & dry  HEENT: atraumatic, normocephalic, TMs clear, throat clear  NECK: supple,no adenopathy   LUNGS: CTA, easy breathing  CV: normal S1S2, RRR without murmur  MS: no spinal or paraspinal TTP in the lumbosacral region. Leg strength 5+ bilaterally. Gait steady. No LE edema. LEs intact to light sensation.    ASSESSMENT AND PLAN:     1. Routine general medical examination at a health care facility    2. Degeneration of intervertebral disc of lumbar region, unspecified whether pain present        Orders Placed This Encounter   Procedures    CBC With Differential With Platelet    Comp Metabolic Panel (14)    Lipid Panel    TSH W Reflex To Free T4    Vitamin D    Vitamin B12     ORTHOPEDIC - INTERNAL      Medications    montelukast 10 MG Oral Tab       The patient indicates understanding of these issues and agrees to the plan.  Follow up prn.

## 2024-12-12 ENCOUNTER — OFFICE VISIT (OUTPATIENT)
Dept: ORTHOPEDICS CLINIC | Facility: CLINIC | Age: 59
End: 2024-12-12
Payer: COMMERCIAL

## 2024-12-12 VITALS — WEIGHT: 150 LBS | HEIGHT: 65 IN | BODY MASS INDEX: 24.99 KG/M2

## 2024-12-12 DIAGNOSIS — M51.360 DEGENERATION OF INTERVERTEBRAL DISC OF LUMBAR REGION WITH DISCOGENIC BACK PAIN: Primary | ICD-10-CM

## 2024-12-12 PROCEDURE — 3008F BODY MASS INDEX DOCD: CPT | Performed by: STUDENT IN AN ORGANIZED HEALTH CARE EDUCATION/TRAINING PROGRAM

## 2024-12-12 PROCEDURE — G2211 COMPLEX E/M VISIT ADD ON: HCPCS | Performed by: STUDENT IN AN ORGANIZED HEALTH CARE EDUCATION/TRAINING PROGRAM

## 2024-12-12 PROCEDURE — 99204 OFFICE O/P NEW MOD 45 MIN: CPT | Performed by: STUDENT IN AN ORGANIZED HEALTH CARE EDUCATION/TRAINING PROGRAM

## 2024-12-13 ENCOUNTER — PATIENT MESSAGE (OUTPATIENT)
Dept: FAMILY MEDICINE CLINIC | Facility: CLINIC | Age: 59
End: 2024-12-13

## 2024-12-13 DIAGNOSIS — E78.00 HIGH CHOLESTEROL: ICD-10-CM

## 2024-12-13 NOTE — H&P
[facility] - ORTHOPEDICS  [address] [place] [address], [address] [address]  [address] [place] [id number]  [phone]     NEW PATIENT VISIT - HISTORY AND PHYSICAL EXAMINATION     Name: Sherlyn Farnsworth   MRN: [id number]  Date: 12/13/24       CC: back pain    REFERRED BY: Leatha Izquierdo DO    HPI:   Sherlyn Farnsworth is a very pleasant 59 year old female who presents today for evaluation of back pain. The distribution of symptoms are: 100% backpain and 0% leg pain. The symptoms began many year(s) ago without any significant injury. Since the onset, the symptoms have become slowly worse over time. Patient feels pain is aggravated by activity and improved by rest. The patient reports no numbness and no weakness.  The symptom characteristics are as follows: Patient is a 59-year-old female presenting with predominantly low back pain symptoms..     Prior spine surgery: none.    Bowel and bladder symptoms: absent.    The patient has not had issues with balance and/or hand dexterity problems such as changes in penmanship or the use of buttons or zippers.    Treatment up to this time has included:    Evaluation: PCP  NSAIDS: have worked well  Narcotic use: None  Physical therapy: previously  Spinal injections: None  Others:       PMH:   Past Medical History:    Arthritis    Age related    Back pain    Age related    Easy bruising    Always    Night sweats    Age related    Pain in joints    Age related    Papanicolaou smear of cervix with atypical squamous cells of undetermined significance (ASC-US)    Sleep disturbance    Age related    Wears glasses       PAST SURGICAL HX:  Past Surgical History:   Procedure Laterality Date    Anterior repair  07/21/2021    Enterocele repair  07/21/2021    Hysterectomy      Dimitris biopsy stereo w/calc 1 site left (cpt=19081)  2014    Radial scar    Dimitris biopsy stereo w/calc 2 site left (cpt=19081/52610) Left 06/2017    Both benign    Dimitris biopsy stereo  w/calc 2 site right (cpt=19081/34238) Right 2017    Both benign    Dimitris localization wire 1 site right (cpt=19281) Right     benign.    Midurethral sling  2021    Posterior repair  2021    Total abdom hysterectomy      Uterosacral ligament suspension  2021    Vaginal hysterectomy  2021    Dr. Torres,   Total vaginal hysterectomy       FAMILY HX:  Family History   Problem Relation Age of Onset    Other (leukemia) Mother     Crohn's Disease Son         Since age 18       ALLERGIES:  Patient has no known allergies.    MEDICATIONS:   Current Outpatient Medications   Medication Sig Dispense Refill    montelukast 10 MG Oral Tab Take 1 tablet (10 mg total) by mouth nightly. 90 tablet 0    rosuvastatin 5 MG Oral Tab Take 1 tablet (5 mg total) by mouth nightly. 30 tablet 2    Meloxicam 15 MG Oral Tab Take 1 tablet (15 mg total) by mouth daily. 30 tablet 1       ROS: A comprehensive 14 point review of systems was performed and was negative aside from the aforementioned per history of present illness.    SOCIAL HX:  Social History     Tobacco Use    Smoking status: Former     Current packs/day: 0.00     Types: Cigarettes     Quit date: 1988     Years since quittin.9    Smokeless tobacco: Never   Substance Use Topics    Alcohol use: Yes     Alcohol/week: 4.0 standard drinks of alcohol     Types: 4 Glasses of wine per week     Comment: occ         PE:   Vitals:    24 1321   Weight: 150 lb (68 kg)   Height: 5' 5\" (1.651 m)     Estimated body mass index is 24.96 kg/m² as calculated from the following:    Height as of this encounter: 5' 5\" (1.651 m).    Weight as of this encounter: 150 lb (68 kg).    Physical Exam  Constitutional:       Appearance: Normal appearance.   HENT:      Head: Normocephalic and atraumatic.   Eyes:      Extraocular Movements: Extraocular movements intact.   Cardiovascular:      Pulses: Normal pulses. Skin warm and well perfused.  Pulmonary:      Effort: Pulmonary  effort is normal. No respiratory distress.   Skin:     General: Skin is warm.   Psychiatric:         Mood and Affect: Mood normal.     Spine Exam:    Normal gait without difficulty  Able to heel, toe, tandem gait without difficulty  Level shoulders and hips in even stance    Restricted L-spine ROM    No tenderness to palpation of L-spine    Straight leg raise test: negative    Sustained clonus: negative    LE Strength: 5/5 IP QUAD TA EHL GSC  LE Sensation: normal in L2-S1 distribution  LE reflexes: normal    Radiographic Examination/Diagnostics:  XR personally viewed, independently interpreted and radiology report was reviewed.  X-ray of the lumbar spine demonstrates degenerative disk disease L4-5      IMPRESSION: Sherlyn Farnsworth is a 59 year old female with lumbar DDD    PLAN:     We reviewed the patients history, symptoms, exam findings, and imaging today.  We had a detailed discussion outlining the etiology, anatomy, pathophysiology, and natural history of lumbar DDD. The typical management of this condition may include lifestyle modification, NSAIDs, physical therapy.  - Provided home exercise program  - Referred to Physical Therapy: home exercise program, aerobic exercises, core strengthening and conditioning, possible manipulative therapy,  and modalities as indicated    FOLLOW-UP:  We will see her back in follow-up in 3 months, or sooner if any problems arise. Patient understands and agrees with plan.      Raghavendra Dale MD  Orthopedic Spine Surgeon  [facility]   [address] [place] [address], [address].[email or web address]  [email or web address]  t: [phone]   f: [phone]        This note was dictated using Dragon software.  While it was briefly proofread prior to completion, some grammatical, spelling, and word choice errors due to dictation may still occur.

## 2024-12-16 RX ORDER — ROSUVASTATIN CALCIUM 5 MG/1
5 TABLET, COATED ORAL NIGHTLY
Qty: 90 TABLET | Refills: 1 | Status: SHIPPED | OUTPATIENT
Start: 2024-12-16

## 2025-01-07 ENCOUNTER — OFFICE VISIT (OUTPATIENT)
Dept: PHYSICAL THERAPY | Facility: HOSPITAL | Age: 60
End: 2025-01-07
Attending: STUDENT IN AN ORGANIZED HEALTH CARE EDUCATION/TRAINING PROGRAM
Payer: COMMERCIAL

## 2025-01-07 DIAGNOSIS — M51.360 DEGENERATION OF INTERVERTEBRAL DISC OF LUMBAR REGION WITH DISCOGENIC BACK PAIN: Primary | ICD-10-CM

## 2025-01-07 PROCEDURE — 97110 THERAPEUTIC EXERCISES: CPT

## 2025-01-07 PROCEDURE — 97162 PT EVAL MOD COMPLEX 30 MIN: CPT

## 2025-01-07 NOTE — PROGRESS NOTES
SPINE EVALUATION:     Diagnosis:   Degeneration of intervertebral disc of lumbar region with discogenic back pain       Referring Provider: Raghavendra Dale  Date of Evaluation:    1/7/2025    Precautions:  None Next MD visit:   none scheduled  Date of Surgery: n/a     PATIENT SUMMARY   Sherlyn Farnsworth is a 59 year old female who presents to therapy today with complaints of chronic LBP that began several years ago and has gradually worsened over time, becoming more constant. She is growing concerned that it will not resolve without further intervention and become more limiting with age. Patient has complex PMH likely contributing to symptoms including 3 complicated births, diastasis recti, hysterectomy with anterior and posterior repair due to uterine prolapse in 2021. States prior to surgery, she underwent pelvic floor PT to address weakness, this initially helped pain, but was recently told by urologist that following surgery, pelvic floor is too tight and likely to be contributing to pain. Has also been referred for pelvic floor PT at this time as well.     Imaging: Yes; lumbar x-ray \"1. No acute process. 2. Extensive/severe lumbar disc disease at L4-5 with mild disc disease throughout the remainder of the lumbar spine. 3. Mild to moderate facet osteoarthritis, mainly within the lower lumbar spine.  No radiographic evidence of spondylolysis.\" X-ray hips unremarkable.   Sleep: States not sleeping well due to pain, recently some improvement with a new mattress.  Current exercise: walking, standing hip adductor stretch, lumbar flexion stretch, \"general stretching.\"    Pt describes pain level current 3/10, at best 3/10, at worst 6/10. Pain is generally constant, felt across low back at belt line and into lateral hips, sharp and shooting, does not radiate down the LEs, worse on the R. States often has a feeling that hips are popping/\"popping out of the socket and going bone on bone.\" States that was told that she had  \"problems with her hips\" since she was a child, went to PT and chiropractor for this, but was recently told by physician that there are no issues with her hips and pain is related to back/pelvic floor. Reports N/T in LISSET great toes, primarily plantar aspect, does not seem to be advancing, has also been present for years.   Pain medication: Yes, given meloxicam, has not been taking because \"makes her feel loopy.\" Thinks that it did help slightly with pain and helped her sleep better.     Current functional limitations include very stiff in the morning upon waking, difficulty standing and lifting legs to don pants, unable to lift 18# cat, difficulty sitting on floor/getting up (has to scoot around so that she can push up completely with hands), pain with bending over/rising back to stand, long walks result in increased pressure that makes her feel like bladder will fall out, sudden urgency for defecation, worried about where bathrooms are at all times, sometimes concerned cannot make 60 min car ride to daughter's house.     Sherlyn describes prior level of function as IND in household and community level activity without limiting pain. Pt goals include to be able to p/u 18# cat and grandchildren when she has them in the future.  Past medical history was reviewed with Sherlyn. Significant findings include:  Past Medical History:    Arthritis    Age related    Back pain    Age related    Easy bruising    Always    Night sweats    Age related    Pain in joints    Age related    Papanicolaou smear of cervix with atypical squamous cells of undetermined significance (ASC-US)    Sleep disturbance    Age related    Wears glasses       Pt denies diplopia, dysarthria, dysphasia, dizziness, drop attacks, bowel/bladder changes, saddle anesthesia, and LSISET LE N/T.    ASSESSMENT  Sherlyn is a 59 year old female who presents for skilled physical therapy evaluation on 1/7/25 with primary c/o chronic LBP that began several years ago and has  gradually worsened over time, becoming more constant. The results of the objective tests and measures show patient with impaired lumbar AROM due to pain, hypomobility of lower lumbar spine, LISSET hip weakness, and abdominal weakness/impaired abdominal activation.  Functional deficits include but are not limited to very stiff in the morning upon waking, difficulty standing and lifting legs to don pants, unable to lift 18# cat, difficulty sitting on floor/getting up (has to scoot around so that she can push up completely with hands), pain with bending over/rising back to stand, long walks result in increased pressure that makes her feel like bladder will fall out, sudden urgency for defecation, worried about where bathrooms are at all times, sometimes concerned cannot make 60 min car ride to daughter's house.  Signs and symptoms are consistent with diagnosis of Degeneration of intervertebral disc of lumbar region with discogenic back pain. Pt and PT discussed evaluation findings, pathology, POC and HEP.  Pt voiced understanding and performs HEP correctly without reported pain. Skilled Physical Therapy is medically necessary to address the above impairments and reach functional goals.     OBJECTIVE:   Observation/Posture: Patient stands with min flattened lumbar spine, no observable lumbar shift. She is pleasant, oriented, compliant, and appropriate, expresses motivation to improve.   Sensation: light touch intact, impaired sensation plantar aspect LISSET great toes     Lumbar AROM: (* denotes performed with pain)  Flexion: fingertips to floor, flattened lumbar spine, pain with flexion and return to standing, generally belt line and into LISSET lateral hips  Extension: 18 deg, pain at L4-5  Sidebending: WNL LISSET, reports R-sided pinching pain with L SB, feeling of R-sided tightness with SB R  Rotation: WNL LISSET, feels like a good stretch    Hip AROM:  R hip ER impaired as compared to L, R hip IR greater/hypermobile as compared  to L    Accessory motion:   Lumbar: hypomobile L3-S1 with pain C-PA and R U-PA, most significant pain L3-5   Hip: not assessed this date, will assess in future sessions  Palpation: min TTP lumbar paraspinals and R glute max    Strength: (* denotes performed with pain)  LE   Hip flexion (L2): R 5/5 with R-sided pain; L 5/5 with R-sided pain  Hip abduction: R 4-/5 with R-sided pain; L 4/5  Hip Extension: R 4-/5 with R-sided pain; L 4/5 with R-sided pain  Knee Flexion: R 5/5; L 5/5   Knee extension (L3): R 5/5 with R-sided pain; L 5/5 with R-sided pain   DF (L4): R 5/5; L 5/5  PF (S1): NT, TBA as needed  Abdominals: 3+/5     Flexibility:   LE   Hip Flexor: TBA  Hamstrings: >80 deg SLR LISSET  Piriformis: R min inc mm tension; L WNL  Quads: NT  Gastroc-soleus: NT     Special tests:   SLR: negative     Gait: pt ambulates on level ground with appropriate gait speed, ER whip in swing on R.   Balance:  NT, TBA as needed     Today’s Treatment and Response:   Pt education was provided on exam findings, treatment diagnosis, treatment plan, expectations, and prognosis. Pt was also provided recommendations for possible soreness after evaluation, modalities as needed [ice/heat], and importance of remaining active  Patient was instructed in and issued a HEP for: LTR, hooklying PPT with TrA, hooklying TrA with march    Charges: PT Eval Moderate Complexity, Therex x 1      Total Timed Treatment: 10 min     Total Treatment Time: 45 min     Based on clinical rationale and outcome measures, this evaluation involved Moderate Complexity decision making due to 1-2 personal factors/comorbidities, 3 body structures involved/activity limitations, and evolving symptoms including changing pain levels.  PLAN OF CARE:    Goals: (To be met in 10 visits)   Patient will improve abdominal strength to at least 4+/5 to improve control with all normal activity.   Patient will improve glute max and glute med strength to at least 4+/5 LISSET.   Patient will  demonstrate ability to bend over and return to standing without pain.   Patient will demonstrate ability to lift at least 20# from the floor with proper mechanics and without pain to be able to lift cat and grandchildren.   Patient will be IND and compliant in HEP to maintain progress made in PT.     Frequency / Duration: Patient will be seen for 1-2 x/week or a total of 10 visits over a 90 day period. Treatment will include: Gait training, Manual Therapy, Mechanical Traction, Neuromuscular Re-education, Therapeutic Exercise, Home Exercise Program instruction, and Modalities to include: Electrical stimulation (unattended), Electrical stimulation (attended), and Ultrasound    Education or treatment limitation: None  Rehab Potential:good    Oswestry Disability Index Score  Score: (Patient-Rptd) 32 % (1/7/2025  1:58 PM)      Patient/Family/Caregiver was advised of these findings, precautions, and treatment options and has agreed to actively participate in planning and for this course of care.    Thank you for your referral. Please co-sign or sign and return this letter via fax as soon as possible to [phone]. If you have any questions, please contact me at Dept: [phone]    Sincerely,  Electronically signed by therapist: Luly Silva, PT, DPT    Physician's certification required: Yes  I certify the need for these services furnished under this plan of treatment and while under my care.    X___________________________________________________ Date____________________    Certification From: 1/7/2025  To:4/7/2025

## 2025-01-08 ENCOUNTER — TELEPHONE (OUTPATIENT)
Dept: PHYSICAL THERAPY | Facility: HOSPITAL | Age: 60
End: 2025-01-08

## 2025-01-09 ENCOUNTER — OFFICE VISIT (OUTPATIENT)
Dept: PHYSICAL THERAPY | Facility: HOSPITAL | Age: 60
End: 2025-01-09
Attending: STUDENT IN AN ORGANIZED HEALTH CARE EDUCATION/TRAINING PROGRAM
Payer: COMMERCIAL

## 2025-01-09 PROCEDURE — 97140 MANUAL THERAPY 1/> REGIONS: CPT

## 2025-01-09 PROCEDURE — 97110 THERAPEUTIC EXERCISES: CPT

## 2025-01-09 NOTE — PROGRESS NOTES
Diagnosis:   Degeneration of intervertebral disc of lumbar region with discogenic back pain         Referring Provider: Raghavendra Dale  Date of Evaluation:    1/7/25    Precautions:  None Next MD visit:   none scheduled  Date of Surgery: n/a   Insurance Primary/Secondary: [employer] OUT OF STATE / N/A     # Auth Visits: 10 per POC            Subjective: Patient states that there was some increased soreness after seeing the urologist and then having PT eval back to back, so took a meloxicam for the past 2 nights and that helped, pain is currently very minimal. She was able to start working on HEP without issue, did not increase pain and feels was the appropriate current level of difficulty for her.     Pain: 0/10 (meloxicam)      Objective:   (1/9/25):  Flexibility:  Cesar Test: negative LISSET, mild tightness ipsilateral hip from knee to chest  Prone quad: heel to glute LISSET, minor increase in feeling of quad tightness L as compared to R      Assessment: Patient flexibility of quads and hip flexors WNL. Continued difficulty performing lower abdominal contraction, some improvement when performed against gravity in quadruped position. Progression of abdominal strengthening in neutral as listed below. Patient able to control pelvis well with all tasks listed. Mild increase in soreness following with some difficulty performing lumbar flexion, however, patient states mm soreness and felt good to work mm, will continue to f/u. Advised to hold on added HEP tasks if very sore later or if are painful when attempting at home.       Goals:  (To be met in 10 visits)   Patient will improve abdominal strength to at least 4+/5 to improve control with all normal activity.   Patient will improve glute max and glute med strength to at least 4+/5 LISSET.   Patient will demonstrate ability to bend over and return to standing without pain.   Patient will demonstrate ability to lift at least 20# from the floor with proper mechanics and without pain to be  able to lift cat and grandchildren.   Patient will be IND and compliant in HEP to maintain progress made in PT.     Plan: Continue PT with progression as indicated.   Date: 1/9/2025  TX#: 2/10 Date:                 TX#: 3/ Date:                 TX#: 4/ Date:                 TX#: 5/ Date:   Tx#: 6/   Manual  - STM lumbar paraspinals, QL  - lateral lumbar oscillations  - C-PA mobs L3-5, gr II/III       Therex  - hooklying PPT with TrA x 15  - supine march with TrA and min toe bridge x 15  - LTR x 15  - hooklying TrA with single leg RTB clam x 10 R/L  - mini bridges 2 x 10  - RTB vertical pallof lifts 2 x 10       -       -       HEP:  LTR, hooklying PPT with TrA, hooklying TrA with march, mini bridge, RTB vertical pallof lifts    Charges: Manual x 1 (20'), Therex x 2 (25')       Total Timed Treatment: 45 min  Total Treatment Time: 45 min

## 2025-01-16 ENCOUNTER — OFFICE VISIT (OUTPATIENT)
Dept: PHYSICAL THERAPY | Facility: HOSPITAL | Age: 60
End: 2025-01-16
Attending: STUDENT IN AN ORGANIZED HEALTH CARE EDUCATION/TRAINING PROGRAM
Payer: COMMERCIAL

## 2025-01-16 PROCEDURE — 97110 THERAPEUTIC EXERCISES: CPT

## 2025-01-16 PROCEDURE — 97140 MANUAL THERAPY 1/> REGIONS: CPT

## 2025-01-16 NOTE — PROGRESS NOTES
Diagnosis:   Degeneration of intervertebral disc of lumbar region with discogenic back pain         Referring Provider: Raghavendra Dale  Date of Evaluation:    1/7/25    Precautions:  None Next MD visit:   none scheduled  Date of Surgery: n/a   Insurance Primary/Secondary: [employer] OUT OF STATE / N/A     # Auth Visits: 10 per POC            Subjective: Patient states that soreness/stiffness at end of last session was not long lasting. She had been feeling pretty good this past week, but woke up today and has been more stiff. Thinks it may be related to going for a longer walk outside yesterday. She has also been working on quadruped abdominal contractions and feels that this is working better for her than in hooklying, can feel that she is getting some intentional contraction now.     Pain: 1/10       Objective:   (1/9/25):  Flexibility:  Cesar Test: negative LISSET, mild tightness ipsilateral hip from knee to chest  Prone quad: heel to glute LISSET, minor increase in feeling of quad tightness L as compared to R      Assessment: Progression supine march to supine 90/90 lift with good ability to perform and maintain appropriate positioning without lumbar arching compensation, no increasing pain. Able to progress to seated on SB with feet together and UE movement, patient with increased postural sway, but appropriate use of postural stabilizers to maintain upright with changing COG with weighted OH reach. Progression HEP as listed below.       Goals:  (To be met in 10 visits)   Patient will improve abdominal strength to at least 4+/5 to improve control with all normal activity. - progress  Patient will improve glute max and glute med strength to at least 4+/5 LISSET. - progress  Patient will demonstrate ability to bend over and return to standing without pain. - progress  Patient will demonstrate ability to lift at least 20# from the floor with proper mechanics and without pain to be able to lift cat and grandchildren. -  progress  Patient will be IND and compliant in HEP to maintain progress made in PT. - issued and progressed     Plan: Continue PT with progression as indicated. 1/2 kneel and kneeling tasks, SB tasks.   Date: 1/9/2025  TX#: 2/10 Date: 1/16/25              TX#: 3/10 Date:                 TX#: 4/ Date:                 TX#: 5/ Date:   Tx#: 6/   Manual  - STM lumbar paraspinals, QL  - lateral lumbar oscillations  - C-PA mobs L3-5, gr II/III Manual  - STM lumbar paraspinals, QL  - lateral lumbar oscillations  - C-PA mobs L3-5, gr II/III; L U-PA mobs gr III L4-5      Therex  - hooklying PPT with TrA x 15  - supine march with TrA and min toe bridge x 15  - LTR x 15  - hooklying TrA with single leg RTB clam x 10 R/L  - mini bridges 2 x 10  - RTB vertical pallof lifts 2 x 10 Therex  - LTR x 15  - hooklying TrA with 90/90 lift x 15   - mini bridge x 10; full bridge with glute set x 15 (feeling better)  - hooklying TrA with single leg BTB clam x 10 R/L  - seated on SB with feet together, 4# BUE OH raise, stop at 90 deg 2 x 10  - YTB 2 step pallof walkout x 10 R/L (towards L more symptomatic)      - -      - -      HEP:  LTR, quadruped TrA, hooklying TrA with 90/90 march, progression to full bridge, RTB vertical pallof lifts    Charges: Manual x 1 (20'), Therex x 2 (25')       Total Timed Treatment: 45 min  Total Treatment Time: 45 min

## 2025-01-21 ENCOUNTER — OFFICE VISIT (OUTPATIENT)
Dept: PHYSICAL THERAPY | Facility: HOSPITAL | Age: 60
End: 2025-01-21
Attending: STUDENT IN AN ORGANIZED HEALTH CARE EDUCATION/TRAINING PROGRAM
Payer: COMMERCIAL

## 2025-01-21 PROCEDURE — 97140 MANUAL THERAPY 1/> REGIONS: CPT

## 2025-01-21 PROCEDURE — 97110 THERAPEUTIC EXERCISES: CPT

## 2025-01-21 NOTE — PROGRESS NOTES
Diagnosis:   Degeneration of intervertebral disc of lumbar region with discogenic back pain         Referring Provider: Raghavendra Dale  Date of Evaluation:    1/7/25    Precautions:  None Next MD visit:   none scheduled  Date of Surgery: n/a   Insurance Primary/Secondary: [employer]BS OUT OF STATE / N/A     # Auth Visits: 10 per POC            Subjective: Patient states that she feels like she has more control over her abdominals and they have been sore lately, like she is using mm that she has not used in a while. She is still having much of the same pain in the morning, getting out of the car, and getting dressed. She has not been having any severe episodes of pain. She has most notable pain with trying to lift heavier loads from the floor such as sewing machine, laundry basket, etc.     Pain: 1/10       Objective:   (1/9/25):  Flexibility:  Cesar Test: negative LISSET, mild tightness ipsilateral hip from knee to chest  Prone quad: heel to glute LISSET, minor increase in feeling of quad tightness L as compared to R    (1/21/25):  Lumbar flexion AROM: fingertips to medial malleoli, pain and tightness with flexion, minimal with return to stand; if initially very tight and painful, can improve with reps      Assessment: Continued hypomobility mid-lower lumbar spine with good response to mobilization. Patient with improving ability to recruit abdominals with intention in quadruped position. Able to progress to quadruped alt UE and LE, fair stability with tendency to fall into rotation, especially when LE WB on L; cues to decrease hip extension and engage abdominals resulted in improved stability. Noted that strengthening tasks outside of or near to neutral result in increased pain and diminished mobility following such as RTB 2 step pallof walkout (anti-rotation). Revised HEP to work primarily in neutral and will f/u next session.       Goals:  (To be met in 10 visits)   Patient will improve abdominal strength to at least 4+/5 to  improve control with all normal activity. - progress  Patient will improve glute max and glute med strength to at least 4+/5 LISSET. - progress  Patient will demonstrate ability to bend over and return to standing without pain. - progress  Patient will demonstrate ability to lift at least 20# from the floor with proper mechanics and without pain to be able to lift cat and grandchildren. - progress  Patient will be IND and compliant in HEP to maintain progress made in PT. - issued and progressed     Plan: Continue PT with progression as indicated. Weighted cable tasks. F/u on revised HEP.   Date: 1/9/2025  TX#: 2/10 Date: 1/16/25              TX#: 3/10 Date: 1/21/25           TX#: 4/10 Date:                 TX#: 5/ Date:   Tx#: 6/   Manual  - STM lumbar paraspinals, QL  - lateral lumbar oscillations  - C-PA mobs L3-5, gr II/III Manual  - STM lumbar paraspinals, QL  - lateral lumbar oscillations  - C-PA mobs L3-5, gr II/III; L U-PA mobs gr III L4-5 Manual  - STM lumbar paraspinals, QL  - lateral lumbar oscillations  - C-PA mobs L3-5, gr II/III; L U-PA mobs gr III L4-5  - SL rotational mobs L side up, gr III     Therex  - hooklying PPT with TrA x 15  - supine march with TrA and min toe bridge x 15  - LTR x 15  - hooklying TrA with single leg RTB clam x 10 R/L  - mini bridges 2 x 10  - RTB vertical pallof lifts 2 x 10 Therex  - LTR x 15  - hooklying TrA with 90/90 lift x 15   - mini bridge x 10; full bridge with glute set x 15 (feeling better)  - hooklying TrA with single leg BTB clam x 10 R/L  - seated on SB with feet together, 4# BUE OH raise, stop at 90 deg 2 x 10  - YTB 2 step pallof walkout x 10 R/L (towards L more symptomatic) Therex  - bridge with warm up x 5-10, progressed to bridge with march x 15, arms at side  - quadruped alt UE/LE with TC hips, 5 sec hold x 10 R/L (very wobbly)  - half kneel on airex with BUE OH reach, 5# hand weight x 15 R/L   - RTB 2 step pallof walkout x 10 R/L (towards L more  symptomatic)  - revised HEP     - - -     - - -     HEP:  quadruped TrA, quadruped alt UE/LE, hooklying TrA with 90/90 march, RTB vertical pallof lifts, 1/2 kneel BUE 5# OH lift    Charges: Manual x 1 (20'), Therex x 2 (25')       Total Timed Treatment: 45 min  Total Treatment Time: 45 min

## 2025-01-24 ENCOUNTER — OFFICE VISIT (OUTPATIENT)
Dept: PHYSICAL THERAPY | Facility: HOSPITAL | Age: 60
End: 2025-01-24
Attending: STUDENT IN AN ORGANIZED HEALTH CARE EDUCATION/TRAINING PROGRAM
Payer: COMMERCIAL

## 2025-01-24 PROCEDURE — 97140 MANUAL THERAPY 1/> REGIONS: CPT

## 2025-01-24 PROCEDURE — 97110 THERAPEUTIC EXERCISES: CPT

## 2025-01-24 NOTE — PROGRESS NOTES
Diagnosis:   Degeneration of intervertebral disc of lumbar region with discogenic back pain         Referring Provider: Raghavendra Dale  Date of Evaluation:    1/7/25    Precautions:  None Next MD visit:   none scheduled  Date of Surgery: n/a   Insurance Primary/Secondary: [employer] OUT OF STATE / N/A     # Auth Visits: 10 per POC            Subjective: Patient states that she was sore after PT on Tuesday, so took a break from exercises on Wednesday and that helped a lot. She has been working on her new HEP and thinks that it is working better for her.     Pain: 1/10       Objective:   (1/9/25):  Flexibility:  Cesar Test: negative LISSET, mild tightness ipsilateral hip from knee to chest  Prone quad: heel to glute LISSET, minor increase in feeling of quad tightness L as compared to R    (1/21/25):  Lumbar flexion AROM: fingertips to medial malleoli, pain and tightness with flexion, minimal with return to stand; if initially very tight and painful, can improve with reps      Assessment: This session, focused primarily with maintaining neutral spine with strengthening and stabilization tasks with good response. Patient able to complete all as listed below without increasing pain and was able to continue to perform lumbar flexion without increasing feeling of lumbar tension/pain following as has been an issue with tasks outside of neutral and with anti-rotation tasks.       Goals:  (To be met in 10 visits)   Patient will improve abdominal strength to at least 4+/5 to improve control with all normal activity. - progress  Patient will improve glute max and glute med strength to at least 4+/5 LISSET. - progress  Patient will demonstrate ability to bend over and return to standing without pain. - progress  Patient will demonstrate ability to lift at least 20# from the floor with proper mechanics and without pain to be able to lift cat and grandchildren. - progress  Patient will be IND and compliant in HEP to maintain progress made in PT. -  issued and progressed     Plan: Continue PT with progression as indicated. Weighted cable tasks.   Date: 1/9/2025  TX#: 2/10 Date: 1/16/25              TX#: 3/10 Date: 1/21/25           TX#: 4/10 Date: 1/24/25                TX#: 5/10 Date:   Tx#: 6/   Manual  - STM lumbar paraspinals, QL  - lateral lumbar oscillations  - C-PA mobs L3-5, gr II/III Manual  - STM lumbar paraspinals, QL  - lateral lumbar oscillations  - C-PA mobs L3-5, gr II/III; L U-PA mobs gr III L4-5 Manual  - STM lumbar paraspinals, QL  - lateral lumbar oscillations  - C-PA mobs L3-5, gr II/III; L U-PA mobs gr III L4-5  - SL rotational mobs L side up, gr III Manual  - STM lumbar paraspinals, QL  - lateral lumbar oscillations  - C-PA mobs L3-5, gr II/III; L U-PA mobs gr III L4-5    Therex  - hooklying PPT with TrA x 15  - supine march with TrA and min toe bridge x 15  - LTR x 15  - hooklying TrA with single leg RTB clam x 10 R/L  - mini bridges 2 x 10  - RTB vertical pallof lifts 2 x 10 Therex  - LTR x 15  - hooklying TrA with 90/90 lift x 15   - mini bridge x 10; full bridge with glute set x 15 (feeling better)  - hooklying TrA with single leg BTB clam x 10 R/L  - seated on SB with feet together, 4# BUE OH raise, stop at 90 deg 2 x 10  - YTB 2 step pallof walkout x 10 R/L (towards L more symptomatic) Therex  - bridge with warm up x 5-10, progressed to bridge with march x 15, arms at side  - quadruped alt UE/LE with TC hips, 5 sec hold x 10 R/L (very wobbly)  - half kneel on airex with BUE OH reach, 5# hand weight x 15 R/L   - RTB 2 step pallof walkout x 10 R/L (towards L more symptomatic)  - revised HEP Therex  - supine 90/90, SB on knees with alt UE press down and lift off 3 x 5 R/L   - quadruped alt UE/LE, 3-5 sec hold x 10 R/L  - weighted cable resisted fwd walking with belt, 25# x 10  - weighted cable resisted retro walking with belt, 25# x 10  - 1/2 kneel on airex with 5# OH reach x 10 R/L in WB  - GTB LISSET horiz abd with abdominal brace x 10     - - - -    - - - -    HEP:  quadruped TrA, quadruped alt UE/LE, hooklying TrA with 90/90 march, RTB vertical pallof lifts, 1/2 kneel BUE 5# OH lift    Charges: Manual x 1 (20'), Therex x 2 (25')       Total Timed Treatment: 45 min  Total Treatment Time: 45 min

## 2025-01-31 ENCOUNTER — OFFICE VISIT (OUTPATIENT)
Dept: PHYSICAL THERAPY | Facility: HOSPITAL | Age: 60
End: 2025-01-31
Attending: STUDENT IN AN ORGANIZED HEALTH CARE EDUCATION/TRAINING PROGRAM
Payer: COMMERCIAL

## 2025-01-31 PROCEDURE — 97140 MANUAL THERAPY 1/> REGIONS: CPT

## 2025-01-31 PROCEDURE — 97110 THERAPEUTIC EXERCISES: CPT

## 2025-01-31 NOTE — PROGRESS NOTES
Diagnosis:   Degeneration of intervertebral disc of lumbar region with discogenic back pain         Referring Provider: Raghavendra Dale  Date of Evaluation:    1/7/25    Precautions:  None Next MD visit:   none scheduled  Date of Surgery: n/a   Insurance Primary/Secondary: [employer] OUT OF STATE / N/A     # Auth Visits: 10 per POC            Subjective: Patient states that she has been working on her HEP and thinks that the exercises she has are good for her. She bought a SB so she could do the 90/90 alt isometrics and feels this is an appropriately challenging exercise. Regarding pain, continues to have notable pain in the morning upon waking, has tried doing a small range squat movement to loosen up, but this results in back pain and feels that LEs are weak. Feels that it does take less time to resolve pain in the morning. Has not had pain with getting off the floor, but has to be careful how she does it. One instance of shooting pain with getting out of the bathtub, rotated towards the R as was getting out. Still pain with donning/doffing pants, but not every time. She has not scheduled PT for pelvic floor yet, but plans to soon, will have to take a break from PT at that time because insurance will not allow 2 POCs at once.     Pain: 1/10 current; can still have instances of sharp and debilitating pain      Objective:   (1/9/25):  Flexibility:  Cesar Test: negative LISSET, mild tightness ipsilateral hip from knee to chest  Prone quad: heel to glute LISSET, minor increase in feeling of quad tightness L as compared to R    (1/21/25):  Lumbar flexion AROM: fingertips to medial malleoli, pain and tightness with flexion, minimal with return to stand; if initially very tight and painful, can improve with reps    (1/31/25):  Repeated motions:   Lateral lumbar glide towards L in neutral spine: no increased pain, but resulted in increased pain/difficulty bending over  Lateral lumbar glide towards L with spine flexion: no increased pain,  but resulted in increased pain/difficulty bending over  Lumbar lateral glide towards R in neutral spine: no pain, improved ability to perform lumbar flexion following     Accessory motion: min hypomobile L2-4 central and R/L unilateral with min-mod pain; mod-max hypomobile with inc pain central L4-5 and R unilateral; normalized lumbar rotation LISSET following mobilization L4-5      Assessment: Patient continues to report pain restricting activity with improvement in instance and intensity with exception of flexion-rotation and lifting heavy loads. She demonstrates improved ability to perform recruitment of abdominals including with resistance activities in neutral spine. Patient with continued hypomobility of central and R L4-5, following mobilization, resulted in normalization of rotation AROM both to R and L without pain or restriction, however, lumbar flexion impaired due to feeling of pain and stiffness; able to improve with lumbar lateral glide towards R; towards L also resulted in impaired ability to perform lumbar flexion. Noted weakness L hip and hip drop in L SLS resulting in lumbar pain. Updated HEP as listed below to address these deficits/findings.       Goals:  (To be met in 10 visits)   Patient will improve abdominal strength to at least 4+/5 to improve control with all normal activity. - progress  Patient will improve glute max and glute med strength to at least 4+/5 LISSET. - progress  Patient will demonstrate ability to bend over and return to standing without pain. - progress  Patient will demonstrate ability to lift at least 20# from the floor with proper mechanics and without pain to be able to lift cat and grandchildren. - progress  Patient will be IND and compliant in HEP to maintain progress made in PT. - issued and progressed     Plan: Continue PT with progression as indicated. Weighted cable tasks. F/u on updated HEP.   Date: 1/9/2025  TX#: 2/10 Date: 1/16/25              TX#: 3/10 Date: 1/21/25            TX#: 4/10 Date: 1/24/25                TX#: 5/10 Date: 1/31/25  Tx#: 6/10   Manual  - STM lumbar paraspinals, QL  - lateral lumbar oscillations  - C-PA mobs L3-5, gr II/III Manual  - STM lumbar paraspinals, QL  - lateral lumbar oscillations  - C-PA mobs L3-5, gr II/III; L U-PA mobs gr III L4-5 Manual  - STM lumbar paraspinals, QL  - lateral lumbar oscillations  - C-PA mobs L3-5, gr II/III; L U-PA mobs gr III L4-5  - SL rotational mobs L side up, gr III Manual  - STM lumbar paraspinals, QL  - lateral lumbar oscillations  - C-PA mobs L3-5, gr II/III; L U-PA mobs gr III L4-5 Manual  - STM lumbar paraspinals, QL  - C-PA and L U-PA mobs gr III/IV ficus on L4-5   Therex  - hooklying PPT with TrA x 15  - supine march with TrA and min toe bridge x 15  - LTR x 15  - hooklying TrA with single leg RTB clam x 10 R/L  - mini bridges 2 x 10  - RTB vertical pallof lifts 2 x 10 Therex  - LTR x 15  - hooklying TrA with 90/90 lift x 15   - mini bridge x 10; full bridge with glute set x 15 (feeling better)  - hooklying TrA with single leg BTB clam x 10 R/L  - seated on SB with feet together, 4# BUE OH raise, stop at 90 deg 2 x 10  - YTB 2 step pallof walkout x 10 R/L (towards L more symptomatic) Therex  - bridge with warm up x 5-10, progressed to bridge with march x 15, arms at side  - quadruped alt UE/LE with TC hips, 5 sec hold x 10 R/L (very wobbly)  - half kneel on airex with BUE OH reach, 5# hand weight x 15 R/L   - RTB 2 step pallof walkout x 10 R/L (towards L more symptomatic)  - revised HEP Therex  - supine 90/90, SB on knees with alt UE press down and lift off 3 x 5 R/L   - quadruped alt UE/LE, 3-5 sec hold x 10 R/L  - weighted cable resisted fwd walking with belt, 25# x 10  - weighted cable resisted retro walking with belt, 25# x 10  - 1/2 kneel on airex with 5# OH reach x 10 R/L in WB  - GTB LISSET horiz abd with abdominal brace x 10 Therex  - trial alt fwd lunges R/L   - weighted cable resisted fwd walking with belt,  25# x 10  - weighted cable resisted retro walking with belt, 25# x 10  - standing marching with 3# weight held OH x 10 R/L (lumbar pain with WB L)  - standing lateral lumbar glide trials R and L; towards R resolved pain  - continued assessment  - discussion, edu clinical findings, POC, expectation for progress, HEP   - - - - -   - - - - -   HEP:  quadruped TrA, quadruped alt UE/LE, hooklying TrA with 90/90 march (can perform sustained iso with SB), RTB vertical pallof lifts, 1/2 kneel BUE 5# OH lift, alt fwd lunge, standing lateral lumbar glide towards R only     Charges: Manual x 1 (20'), Therex x 3 (35')       Total Timed Treatment: 55 min  Total Treatment Time: 55 min

## 2025-02-07 ENCOUNTER — OFFICE VISIT (OUTPATIENT)
Dept: PHYSICAL THERAPY | Facility: HOSPITAL | Age: 60
End: 2025-02-07
Attending: STUDENT IN AN ORGANIZED HEALTH CARE EDUCATION/TRAINING PROGRAM
Payer: COMMERCIAL

## 2025-02-07 PROCEDURE — 97110 THERAPEUTIC EXERCISES: CPT

## 2025-02-07 PROCEDURE — 97140 MANUAL THERAPY 1/> REGIONS: CPT

## 2025-02-07 NOTE — PROGRESS NOTES
Progress/Discharge Summary  Pt has attended 7 visits in Physical Therapy.     Diagnosis:   Degeneration of intervertebral disc of lumbar region with discogenic back pain         Referring Provider: Raghavendra Dale  Date of Evaluation:    1/7/25    Precautions:  None Next MD visit:   none scheduled  Date of Surgery: n/a   Insurance Primary/Secondary: [employer] OUT OF STATE / N/A     # Auth Visits: 10 per POC            Subjective: Patient states that she has been working on her updated HEP for the past week and while pain in the morning upon waking is the same, she is able to get moving faster with doing the lateral glides at the wall. She notices that she is not as uncomfortable in sitting, including yesterday having to spend several hours in the car. Not having as many episodes of debilitating pain and typically not as severe. There is still pain and locking up with lumbar flexion and rotation, but not constant and after last session, lumbar rotation was better for several days. Overall, feels 60-70% improved since SOC. She feels that she is able to contract her abdominals/is in tune with those mm when she was not prior to PT. She feels that she will likely continue to improve with continued HEP, going to take a break from this POC so that she can begin pelvic floor PT and will return as needed in the future.     Pain: 0-1/10 current; can still have instances of sharp and debilitating pain      Objective:   (1/9/25):  Flexibility:  Cesar Test: negative LISSET, mild tightness ipsilateral hip from knee to chest  Prone quad: heel to glute LISSET, minor increase in feeling of quad tightness L as compared to R    (1/21/25):  Lumbar flexion AROM: fingertips to medial malleoli, pain and tightness with flexion, minimal with return to stand; if initially very tight and painful, can improve with reps    (1/31/25):  Repeated motions:   Lateral lumbar glide towards L in neutral spine: no increased pain, but resulted in increased  pain/difficulty bending over  Lateral lumbar glide towards L with spine flexion: no increased pain, but resulted in increased pain/difficulty bending over  Lumbar lateral glide towards R in neutral spine: no pain, improved ability to perform lumbar flexion following     Accessory motion: min hypomobile L2-4 central and R/L unilateral with min-mod pain; mod-max hypomobile with inc pain central L4-5 and R unilateral; normalized lumbar rotation LISSET following mobilization L4-5    (2/7/25):  Lumbar AROM: (* denotes performed with pain)  Flexion: fingertips to floor with effort, flattened lumbar spine, pain with flexion and return to standing, generally belt line and into LISSET lateral hips  Extension: 28 deg, pain at L4-5  Sidebending: WNL LISSET, reports R-sided pinching pain with L SB  Rotation: WNL LISSET, initial tightness resolves with reps     Accessory motion: min hypomobile L2-4 central and R/L unilateral with min-mod pain; mod-max hypomobile with inc pain central L4-5 and R unilateral; normalized lumbar rotation LISSET following mobilization L4-5    Strength: (* denotes performed with pain)  LE   Hip flexion (L2): 5/5 LISSET  Hip abduction: R 4+/5; L 5/5  Hip Extension: R 4+/5 with R-sided pain; L 4+/5 with L-sided pain (unable to contract glutes individually or without co-contraction lumbar)  Knee Flexion: R 5/5; L 5/5            Knee extension (L3): 5/5 LISSET           DF (L4): R 5/5; L 5/5  Abdominals: 4/5      Squat lift: able to perform 30# assisted lift on cable machine with LBP, improving glute recruitment and decreasing pain with reps/education      Assessment: Patient has made progress with skilled PT intervention since SOC, but pain with lumbar flexion and lifting moderate to heavy loads remains. She demonstrates improved LE strength throughout now without pain to manual resistance with exception of glutes; patient with difficulty recruiting and conchita glute max without co-contraction LISSET glutes and lumbar  paraspinals resulting in pain. Education and NR to improve recruitment and control; progressed to performing with functional squatting and lifting, patient to self-progress as continues with IND HEP as she currently will put this POC on hold to complete pelvic floor PT. Significantly improved ability to perform abdominal recruitment and contraction, but not yet progressed to all functional activity. At this time, patient has made good progress, but has not yet met all goals, will be placed on hold per plan as listed below.       Goals:  (To be met in 10 visits)   Patient will improve abdominal strength to at least 4+/5 to improve control with all normal activity. - progressing  Patient will improve glute max and glute med strength to at least 4+/5 LISSET. - MET strength, but painful  Patient will demonstrate ability to bend over and return to standing without pain. - progressing  Patient will demonstrate ability to lift at least 20# from the floor with proper mechanics and without pain to be able to lift cat and grandchildren. - progressing  Patient will be IND and compliant in HEP to maintain progress made in PT. - MET      Plan: Patient to hold from formal PT intervention with continued IND HEP to allow her to participate in pelvic floor rehab. Should patient note regression or other need to return to this POC within dates listed below, may return as appropriate. If do not hear from patient in that time, will consider this date D/C from PT.     Patient/Family/Caregiver was advised of these findings, precautions, and treatment options and has agreed to actively participate in planning and for this course of care.    Thank you for your referral. If you have any questions, please contact me at Dept: [phone].    Sincerely,  Electronically signed by therapist: Luly Silva, PT, DPT     Physician's certification required:  No    Certification From: 2/7/2025  To:5/8/2025     Date: 1/9/2025  TX#: 2/10 Date: 1/16/25               TX#: 3/10 Date: 1/21/25           TX#: 4/10 Date: 1/24/25                TX#: 5/10 Date: 1/31/25  Tx#: 6/10 Date: 2/7/25  Tx#: 7/10   Manual  - STM lumbar paraspinals, QL  - lateral lumbar oscillations  - C-PA mobs L3-5, gr II/III Manual  - STM lumbar paraspinals, QL  - lateral lumbar oscillations  - C-PA mobs L3-5, gr II/III; L U-PA mobs gr III L4-5 Manual  - STM lumbar paraspinals, QL  - lateral lumbar oscillations  - C-PA mobs L3-5, gr II/III; L U-PA mobs gr III L4-5  - SL rotational mobs L side up, gr III Manual  - STM lumbar paraspinals, QL  - lateral lumbar oscillations  - C-PA mobs L3-5, gr II/III; L U-PA mobs gr III L4-5 Manual  - STM lumbar paraspinals, QL  - C-PA and L U-PA mobs gr III/IV focus on L4-5 Manual  - STM lumbar paraspinals, QL  - C-PA and L U-PA mobs gr III/IV focus on L4-5   Therex  - hooklying PPT with TrA x 15  - supine march with TrA and min toe bridge x 15  - LTR x 15  - hooklying TrA with single leg RTB clam x 10 R/L  - mini bridges 2 x 10  - RTB vertical pallof lifts 2 x 10 Therex  - LTR x 15  - hooklying TrA with 90/90 lift x 15   - mini bridge x 10; full bridge with glute set x 15 (feeling better)  - hooklying TrA with single leg BTB clam x 10 R/L  - seated on SB with feet together, 4# BUE OH raise, stop at 90 deg 2 x 10  - YTB 2 step pallof walkout x 10 R/L (towards L more symptomatic) Therex  - bridge with warm up x 5-10, progressed to bridge with march x 15, arms at side  - quadruped alt UE/LE with TC hips, 5 sec hold x 10 R/L (very wobbly)  - half kneel on airex with BUE OH reach, 5# hand weight x 15 R/L   - RTB 2 step pallof walkout x 10 R/L (towards L more symptomatic)  - revised HEP Therex  - supine 90/90, SB on knees with alt UE press down and lift off 3 x 5 R/L   - quadruped alt UE/LE, 3-5 sec hold x 10 R/L  - weighted cable resisted fwd walking with belt, 25# x 10  - weighted cable resisted retro walking with belt, 25# x 10  - 1/2 kneel on airex with 5# OH reach x 10  R/L in WB  - GTB LISSET horiz abd with abdominal brace x 10 Therex  - trial alt fwd lunges R/L   - weighted cable resisted fwd walking with belt, 25# x 10  - weighted cable resisted retro walking with belt, 25# x 10  - standing marching with 3# weight held OH x 10 R/L (lumbar pain with WB L)  - standing lateral lumbar glide trials R and L; towards R resolved pain  - continued assessment  - discussion, edu clinical findings, POC, expectation for progress, HEP Therex  - goals reassessment as needed and listed  - discussion, edu clinical findings, POC, HEP, HEP progression  - edu and NR single and co-contraction LISSET glutes without lumbar paraspinal contraction  - education and performance functional squats without weight tap to chair with glute contraction, lumbar relax and weighted cable 30#    - - - - - -   - - - - - -   HEP:  quadruped TrA, quadruped alt UE/LE, hooklying TrA with 90/90 march (can perform sustained iso with SB), RTB vertical pallof lifts, 1/2 kneel BUE 5# OH lift, alt fwd lunge, standing lateral lumbar glide towards R only, single and alternating glute contraction, squat tap to chair     Charges: Manual x 1 (15'), Therex x 2 (25')       Total Timed Treatment: 40 min  Total Treatment Time: 40 min

## 2025-02-21 ENCOUNTER — ORDER TRANSCRIPTION (OUTPATIENT)
Dept: PHYSICAL THERAPY | Facility: HOSPITAL | Age: 60
End: 2025-02-21

## 2025-02-21 DIAGNOSIS — M62.89 MYOFIBROSIS: Primary | ICD-10-CM

## 2025-02-21 DIAGNOSIS — N81.9 PROLAPSE OF FEMALE GENITAL ORGANS: ICD-10-CM

## 2025-03-05 ENCOUNTER — OFFICE VISIT (OUTPATIENT)
Dept: PHYSICAL THERAPY | Age: 60
End: 2025-03-05
Attending: STUDENT IN AN ORGANIZED HEALTH CARE EDUCATION/TRAINING PROGRAM
Payer: COMMERCIAL

## 2025-03-05 ENCOUNTER — TELEPHONE (OUTPATIENT)
Dept: PHYSICAL THERAPY | Facility: HOSPITAL | Age: 60
End: 2025-03-05

## 2025-03-05 DIAGNOSIS — N81.9 PROLAPSE OF FEMALE GENITAL ORGANS: ICD-10-CM

## 2025-03-05 DIAGNOSIS — M62.89 MYOFIBROSIS: Primary | ICD-10-CM

## 2025-03-05 PROCEDURE — 97161 PT EVAL LOW COMPLEX 20 MIN: CPT

## 2025-03-05 NOTE — PROGRESS NOTES
MUSCULOSKELETAL AND PELVIC FLOOR EVALUATION:     Diagnosis:   Myofibrosis (M62.89)  Prolapse of female genital organs (N81.9) Patient:  Sherlyn Farnsworth (60 year old, female)        Referring Provider: Raghavendra Dale  Today's Date   3/5/2025    Precautions:  None   Date of Evaluation: 25  Next MD visit: No data recorded  Date of Surgery:  prolapse repair and partial hysterectomy     PATIENT SUMMARY   Summary of chief complaints: feeling something on the R side with walking. the feeling like a tampon is falling out. pt did attempt intercourse post surgery and was very uncomfortable and had a lot of bleeding. has not attempted since. pt has been seeing Luly Silva PT for low back pain. she has a hard time bending forward to nilton/doff her pants.  History of current condition: pt feels a bulge or something is not feeling right with walking and intermittently during the day   Pain level: current 5 /10, at best  , at worst    Description of symptoms: pain in the low back and pelvic pain - R side   Occupation: not working, activities, ADLs at home. like to garden   Leisure activities/Hobbies: gardening and house projects   Prior level of function: I  Current limitations: walking / lifting up her dtrs cat without low back pain cannot bend forward  Pt goals: stop the pelvic pain and discomfort and increase the low back flexibility and mobility  Pregnant Now: No (3 vaginal births, first birth involved a forcep rotation)   OB History    Para Term  AB Living   3 3 0 0 0 0   SAB IAB Ectopic Multiple Live Births   0 0 0 0 0      # Outcome Date GA Lbr Aiden/2nd Weight Sex Type Anes PTL Lv   3 Para            2 Para            1 Para              Urodynamic Test: none at this time   Manometry: NA   PFDI 20    Scores   POPDI 6:   0    CRAD 8:   0    SHERI 6:   0    Summary:   0           Sexual Health Status  Marinoff Scale: NA   History of sexual abuse: NA   Sexual intercourse status: not active due to pain-  would like to get back to intercourse     Past medical history was reviewed with Sherlyn.  Significant findings include:    Imaging/Tests: ESPERANZA Plata  has a past medical history of Arthritis, Back pain, Easy bruising, Night sweats (2015), Pain in joints, Papanicolaou smear of cervix with atypical squamous cells of undetermined significance (ASC-US) (06/29/2012), Sleep disturbance (2015), and Wears glasses (2000).  She  has a past surgical history that includes irais localization wire 1 site right (cpt=19281) (Right, 2004); irais biopsy stereo w/calc 2 site right (cpt=19081/73211) (Right, 06/2017); irais biopsy stereo w/calc 2 site left (cpt=19081/24341) (Left, 06/2017); hysterectomy; uterosacral ligament suspension (07/21/2021); anterior repair (07/21/2021); enterocele repair (07/21/2021); posterior repair (07/21/2021); midurethral sling (07/21/2021); vaginal hysterectomy (06/2021); total abdom hysterectomy; and irais biopsy stereo w/calc 1 site left (cpt=19081) (2014).    ASSESSMENT  Sherlyn presents to physical therapy evaluation with primary c/o feeling something on the R side with walking. the feeling like a tampon is falling out. pt did attempt intercourse post surgery and was very uncomfortable and had a lot of bleeding. has not attempted since. pt has been seeing Luly Shira PT for low back pain. she has a hard time bending forward to nilton/doff her pants.. The results of the objective tests and measures show increased tightness in the LA today  R> L. Functional deficits include but are not limited to walking / lifting up her dtrs cat without low back pain cannot bend forward. Signs and symptoms are consistent with diagnosis of Myofibrosis (M62.89)  Prolapse of female genital organs (N81.9). Pt and PT discussed evaluation findings, pathology, POC and HEP.  Pt voiced understanding and performs HEP correctly without reported pain. Skilled Physical Therapy is medically necessary to address the above impairments and reach  functional goals.    OBJECTIVE:    Musculoskeletal  Posture: Posture: rounded shoulders   Pelvic Alignment: NA   External Palpation: + piriformis R >L.   psoas , ITB   Scars (location/surgery):    Abdominal Wall Integrity: not assessed today   Diastasis Recti:  (NA)         Special Tests:    Informed consent for internal pelvic evaluation given:Yes     External Observation:   Voluntary contraction: -- (not tested due to to PF tightness)   Voluntary relaxation:     Involuntary contraction:     Involuntary relaxation:     Mons pubis: WNL   Labia majora: WNL  Labia minora: WNL   Urethral meatus: WNL   Introitus: WNL   Perineal body: WNL  Anus/External Anal Sphincter: WNL    Internal Examination   Scar:      Pelvic Floor Muscle strength: (PERF= Power/Endurance/Reps/Fast) MMT:  Power Endurance REPS Fast                    Tissue Laxity Test:  Anterior Wall: min  Posterior Wall: WFL  Apical:           Internal Palpation:    Superficial Transverse Perineal  moderate restriction   Bulbospongiosus moderate restriction   Ischiocavernosus moderate restriction   Deep Transverse Perineal moderate restriction   Compress Urethra/Sphincter moderate restriction   Urethrovaginalis moderate restriction   Pubococcygeus moderate restriction   Iliococcygeus moderate restriction   Coccygeus moderate restriction   Piriformis (palpated externally)  moderate restriction   Obturator Internus  moderate restriction   Pelvic Clock moderate restriction     ROM and Strength  (* denotes performed with pain)  Hip   ROM MMT (-/5)    R L R L     Flex (L2) 120 120         Ext              Abd             ER             IR                Flexibility:  LE Flexibility R L     Hip Flexor mod restricted mod restricted     Hamstrings min restricted min restricted     ITB min restricted min restricted     Piriformis mod restricted min restricted     Quads min restricted min restricted     Gastroc-soleus min restricted min restricted     Adductors          Neurological  Deep Tendon Reflexes:    Patella: R:  , L:  ;          Achilles: R:  , L:     Sensory/Reflex:  Vestibule: not tested   Anal [place]: not tested   Cough Reflex:       Balance and Functional Mobility  Gait: pt ambulates on level ground with decreased step length; decreased stance phase.  SLS EO: R      L          Today's Treatment and Response:   Pt education was provided on exam findings, treatment diagnosis, treatment plan, expectations, and prognosis.  Today's Treatment       3/5/2025   Pelvic Treatment   Therapeutic Exercise Today no HEP issued. Pt will cont with the exercises issued from Luly at this time.    Manual Therapy MFR today - LA   Psoas release    Education How not to strain with BM  Pt to order a wand and bring it next appt.    Evaluation Minutes 45   Total Time Of Timed Procedures 0   Total Time Of Service-Based Procedures 45   Total Treatment Time 45   HEP Stretches at home         Patient was instructed in and issued a HEP for: Stretches at home     Charges:  PT EVAL: Low Complexity, Eval 1  In agreement with evaluation findings and clinical rationale, this evaluation involved LOW COMPLEXITY decision making due to no personal factors/comorbidities, 1-2 body structures involved/activity limitations, and stable symptoms as documented in the evaluation.                                                                       PLAN OF CARE:    Goals: (to be met in 8 visits)   Pt will be I with HEP  Pt will report ability to bend forward to nilton and doff pants   Pt will be able to demo full contraction of the PF with relaxation post contractions  Pt will demo figure 4 in sitting on the R to nilton and doff socks and shoes  Improve psoas tightness so that pt can ambulate with full ext in the hips   Pt will report no fecal smearing post BM  Pt will report improved frequency of BM  Pt will report no pain with intercourse          ASSESSMENT & PLAN OF CARE:    Sherlyn presents with the following:   increased PF tone today   Signs and symptoms are consistent with patient’s diagnosis.  Sherlyn would benefit from skilled Urogyne Therapy to address the above impairments to meet personal and therapeutic goals outlined below.            Education or treatment limitation:  none   Rehab Potential:good       Frequency / Duration: Patient will be seen 1 x a weekx/week or a total of 8  visits over a 90 day period. Treatment will include: Manual Therapy; Neuromuscular Re-education; Therapeutic Activities; Home Exercise Program instruction    Education or treatment limitation: None   Rehab Potential: good         Patient/Family/Caregiver was advised of these findings, precautions, and treatment options and has agreed to actively participate in planning and for this course of care.    Thank you for your referral. Please co-sign or sign and return this letter via fax as soon as possible to [phone]. If you have any questions, please contact me at Dept: [phone]    Sincerely,  Electronically signed by therapist: Lawanda Crooks, PT  Physician's certification required: Yes  I certify the need for these services furnished under this plan of treatment and while under my care.    X___________________________________________________ Date____________________    Certification From: 3/5/2025  To:6/3/2025

## 2025-03-12 ENCOUNTER — OFFICE VISIT (OUTPATIENT)
Dept: PHYSICAL THERAPY | Age: 60
End: 2025-03-12
Attending: STUDENT IN AN ORGANIZED HEALTH CARE EDUCATION/TRAINING PROGRAM
Payer: COMMERCIAL

## 2025-03-12 PROCEDURE — 97110 THERAPEUTIC EXERCISES: CPT

## 2025-03-12 NOTE — PROGRESS NOTES
Patient: Sherlyn Farnsworth (60 year old, female) Referring Provider:  Insurance:   Diagnosis: Myofibrosis (M62.89)  Prolapse of female genital organs (N81.9) Raghavendra Dale  BCBS OUT OF STATE   Date of Surgery: 2021 prolapse repair and partial hysterectomy Next MD visit:  N/A   Precautions:  None No data recorded Referral Information:    Date of Evaluation: Req: 0, Auth: 0, Exp:     03/05/25 POC Auth Visits:  8       Today's Date   3/12/2025    Subjective          Pain: 3/10 (stiff in the low back - worse bending forward)     Objective  lack of lumbar flexion -  mod restrictions in lumbar spine wiht mobs increased hip flexor tightness psoas B R > L            Assessment  pt did feel slightly looser post visit today    Goals (to be met in 8 visits)   Pt will be I with HEP  Pt will report ability to bend forward to nilton and doff pants   Pt will be able to demo full contraction of the PF with relaxation post contractions  Pt will demo figure 4 in sitting on the R to nilton and doff socks and shoes  Improve psoas tightness so that pt can ambulate with full ext in the hips   Pt will report no fecal smearing post BM  Pt will report improved frequency of BM  Pt will report no pain with intercourse          ASSESSMENT & PLAN OF CARE:    Sherlyn presents with the following:  increased PF tone today   Signs and symptoms are consistent with patient’s diagnosis.  Sherlyn would benefit from skilled Urogyne Therapy to address the above impairments to meet personal and therapeutic goals outlined below.            Education or treatment limitation:  none   Rehab Potential:good           Plan  cont wiht MFR next visit- review of wand with pt    Treatment Last 4 Visits       3/12/2025   PT Treatment   Treatment Day 2          3/5/2025 3/12/2025   Pelvic Treatment   Treatment Day  2   Therapeutic Exercise Today no HEP issued. Pt will cont with the exercises issued from L[facility] at this time.  PPU 10 x 3 sec holds   DKTC 2 x 30 secs    TrA  contractions with bridge 5 x   Cesar stretch 3 x 30 secs B   Piriformis roller  Tennis ball to lumbar spine on floor   90/90 taps PPT    Manual Therapy MFR today - LA   Psoas release  PA glides lumbar spine  Mult bouts     Psoas release hip flexors        Education How not to strain with BM  Pt to order a wand and bring it next appt.   To stop sitting on her R leg under her-    Therapeutic Exercise Minutes  45   Evaluation Minutes 45    Total Time Of Timed Procedures 0 45   Total Time Of Service-Based Procedures 45 0   Total Treatment Time 45 45   HEP Stretches at home          HEP  Stretches at home     Charges  EX 3  Low Complexity

## 2025-03-19 ENCOUNTER — OFFICE VISIT (OUTPATIENT)
Dept: PHYSICAL THERAPY | Age: 60
End: 2025-03-19
Attending: STUDENT IN AN ORGANIZED HEALTH CARE EDUCATION/TRAINING PROGRAM
Payer: COMMERCIAL

## 2025-03-19 PROCEDURE — 97140 MANUAL THERAPY 1/> REGIONS: CPT

## 2025-03-19 PROCEDURE — 97110 THERAPEUTIC EXERCISES: CPT

## 2025-03-19 NOTE — PROGRESS NOTES
Patient: Sherlyn Farnsworth (60 year old, female) Referring Provider:  Insurance:   Diagnosis:   Raghavendra Dale  BCBS OUT OF STATE   Date of Surgery: No data recorded Next MD visit:  N/A   Precautions:    No data recorded Referral Information:    Date of Evaluation: Req: 0, Auth: 0, Exp:     No data recorded POC Auth Visits:  8        Today's Date   3/19/2025    Subjective  pt reports getting some relief in her low back on the R side. pt was able to clean her floors and get her pants on with less pain R low back/ SI joint area       Pain: 2/10     Objective          + LA on the R  increased tone   Loss of R hip ER today   + psoas      Assessment  pt did have a loss of ER on the R hip, working to not sit on her R leg. pt still demonstrating increased tone on the R LA with MFR. Some hip flexor soreness with PPU exercise. Advised to go to elbows only.  Over all feeling improvement in the low back stiffness.     Goals (to be met in 8 visits)   Pt will be I with HEP  Pt will report ability to bend forward to nilton and doff pants   Pt will be able to demo full contraction of the PF with relaxation post contractions  Pt will demo figure 4 in sitting on the R to nilton and doff socks and shoes  Improve psoas tightness so that pt can ambulate with full ext in the hips   Pt will report no fecal smearing post BM  Pt will report improved frequency of BM  Pt will report no pain with intercourse          ASSESSMENT & PLAN OF CARE:    Sherlyn presents with the following:  increased PF tone today   Signs and symptoms are consistent with patient’s diagnosis.  Sherlyn would benefit from skilled Urogyne Therapy to address the above impairments to meet personal and therapeutic goals outlined below.            Education or treatment limitation:  none   Rehab Potential:good               Plan  cont with PT-  re assess the R LA    Treatment Last 4 Visits       3/12/2025 3/19/2025   PT Treatment   Treatment Day 2 3    3       Multiple values from one  day are sorted in reverse-chronological order          3/5/2025 3/12/2025 3/19/2025   Pelvic Treatment   Treatment Day  2 3    3   Therapeutic Exercise Today no HEP issued. Pt will cont with the exercises issued from [facility] at this time.  PPU 10 x 3 sec holds   DKTC 2 x 30 secs    TrA contractions with bridge 5 x   Cesar stretch 3 x 30 secs B   Piriformis roller  Tennis ball to lumbar spine on floor   90/90 taps PPT  PPU - 10 x    Manual Therapy MFR today - LA   Psoas release  PA glides lumbar spine  Mult bouts     Psoas release hip flexors      MFR LA B   Wand education on the R    Mobs R hip inf and lateral 10 x with belt    Mobs lumbar spine grade 2-3 mult bouts          Education How not to strain with BM  Pt to order a wand and bring it next appt.   To stop sitting on her R leg under her-     Therapeutic Exercise Minutes  45    Evaluation Minutes 45     Total Time Of Timed Procedures 0 45 0   Total Time Of Service-Based Procedures 45 0 0   Total Treatment Time 45 45 0   HEP Stretches at home          Multiple values from one day are sorted in reverse-chronological order        HEP  Stretches at home     Charges     Low Complexity  EX 1 MT 2

## 2025-03-25 ENCOUNTER — OFFICE VISIT (OUTPATIENT)
Dept: PHYSICAL THERAPY | Age: 60
End: 2025-03-25
Attending: STUDENT IN AN ORGANIZED HEALTH CARE EDUCATION/TRAINING PROGRAM
Payer: COMMERCIAL

## 2025-03-25 PROCEDURE — 97110 THERAPEUTIC EXERCISES: CPT

## 2025-03-25 PROCEDURE — 97140 MANUAL THERAPY 1/> REGIONS: CPT

## 2025-03-25 NOTE — PROGRESS NOTES
Patient: Sherlyn Farnsworth (60 year old, female) Referring Provider:  Insurance:   Diagnosis:   Raghavendra Dale  BCBS [place] OF [place]   Date of Surgery: No data recorded Next MD visit:  N/A   Precautions:    No data recorded Referral Information:    Date of Evaluation: Req: 0, Auth: 0, Exp:     No data recorded POC Auth Visits:  8        Today's Date   3/25/2025    Subjective  pt was feeling better post visit. did something to her R shoulder- feeling better today. used the wand at home. leaving for her trip this thursday.       Pain: 1/10     Objective  + MFR LA R > L          + LA on the R  increased tone   Loss of R hip ER today   + psoas          Assessment  pt did feel sore post visit in the mid back- will use the wand on the R at home    Goals (to be met in 8 visits)   Pt will be I with HEP  Pt will report ability to bend forward to nilton and doff pants   Pt will be able to demo full contraction of the PF with relaxation post contractions  Pt will demo figure 4 in sitting on the R to nilton and doff socks and shoes  Improve psoas tightness so that pt can ambulate with full ext in the hips   Pt will report no fecal smearing post BM  Pt will report improved frequency of BM  Pt will report no pain with intercourse          ASSESSMENT & PLAN OF CARE:    Sherlyn presents with the following:  increased PF tone today   Signs and symptoms are consistent with patient’s diagnosis.  Sherlyn would benefit from skilled Urogyne Therapy to address the above impairments to meet personal and therapeutic goals outlined below.            Education or treatment limitation:  none   Rehab Potential:good                   Plan  cont with PT for the PF - core strength next appt    Treatment Last 4 Visits       3/12/2025 3/19/2025 3/25/2025   PT Treatment   Treatment Day 2 3    3 4       Multiple values from one day are sorted in reverse-chronological order          3/5/2025 3/12/2025 3/19/2025 3/25/2025   Pelvic Treatment   Treatment Day  2 3    3  4   Therapeutic Exercise Today no HEP issued. Pt will cont with the exercises issued from [facility] at this time.  PPU 10 x 3 sec holds   DKTC 2 x 30 secs    TrA contractions with bridge 5 x   Cesar stretch 3 x 30 secs B   Piriformis roller  Tennis ball to lumbar spine on floor   90/90 taps PPT  PPU - 10 x  MFR LA B   Mobs R hip inf and lateral 10 each   Prone mobs lumbar spine grade 2-3 mult bouts      Manual Therapy MFR today - LA   Psoas release  PA glides lumbar spine  Mult bouts     Psoas release hip flexors      MFR LA B   Wand education on the R    Mobs R hip inf and lateral 10 x with belt    Mobs lumbar spine grade 2-3 mult bouts        DKTC   LTR    Education How not to strain with BM  Pt to order a wand and bring it next appt.   To stop sitting on her R leg under her-      Therapeutic Exercise Minutes  45  15   Manual Therapy Minutes    30   Evaluation Minutes 45      Total Time Of Timed Procedures 0 45 0 45   Total Time Of Service-Based Procedures 45 0 0 0   Total Treatment Time 45 45 0 45   HEP Stretches at home           Multiple values from one day are sorted in reverse-chronological order        HEP  Stretches at home     Charges  MT 2 EX 1  Low Complexity

## 2025-04-09 ENCOUNTER — TELEPHONE (OUTPATIENT)
Dept: PHYSICAL THERAPY | Facility: HOSPITAL | Age: 60
End: 2025-04-09

## 2025-04-09 ENCOUNTER — OFFICE VISIT (OUTPATIENT)
Dept: PHYSICAL THERAPY | Age: 60
End: 2025-04-09
Attending: STUDENT IN AN ORGANIZED HEALTH CARE EDUCATION/TRAINING PROGRAM
Payer: COMMERCIAL

## 2025-04-09 PROCEDURE — 97140 MANUAL THERAPY 1/> REGIONS: CPT

## 2025-04-09 NOTE — PROGRESS NOTES
Patient: Sherlyn Farnsworth (60 year old, female) Referring Provider:  Insurance:   Diagnosis:   Raghavendra Dale  [place]   Date of Surgery: No data recorded Next MD visit:  N/A   Precautions:    No data recorded Referral Information:    Date of Evaluation: Req: 0, Auth: 0, Exp:     No data recorded POC Auth Visits:  8        Today's Date   4/9/2025    Subjective  back from her trip. after the last appt not as much soreness. did not use the wand over the trip. yesterday wand use - sore on the L side. chairs on the trip were low, stiff coming back in the low back ,SI soreness and burning B today       Pain: 1/10     Objective  slight tightness LA on the L today. still very tight L >R with mobs in [facility] lumbar spine            Assessment  less tightness on the R LA today- L was slightly tight- pt to cont with the wand at home;    Goals (to be met in 8 visits)   Pt will be I with HEP  Pt will report ability to bend forward to nilton and doff pants   Pt will be able to demo full contraction of the PF with relaxation post contractions  Pt will demo figure 4 in sitting on the R to nilton and doff socks and shoes  Improve psoas tightness so that pt can ambulate with full ext in the hips   Pt will report no fecal smearing post BM  Pt will report improved frequency of BM  Pt will report no pain with intercourse          ASSESSMENT & PLAN OF CARE:    Sherlyn presents with the following:  increased PF tone today   Signs and symptoms are consistent with patient’s diagnosis.  Sherlyn would benefit from skilled Urogyne Therapy to address the above impairments to meet personal and therapeutic goals outlined below.            Education or treatment limitation:  none   Rehab Potential:good                     Plan  cont with PT    Treatment Last 4 Visits  Treatment Day: 5       3/12/2025 3/19/2025 3/25/2025 4/9/2025   Pelvic Treatment   Therapeutic Exercise PPU 10 x 3 sec holds   DKTC 2 x 30 secs    TrA contractions with bridge 5 x   Cesar  stretch 3 x 30 secs B   Piriformis roller  Tennis ball to lumbar spine on floor   90/90 taps PPT  PPU - 10 x  MFR LA B   Mobs R hip inf and lateral 10 each   Prone mobs lumbar spine grade 2-3 mult bouts    Prayer stretch 2 x 30 secs   Manual Therapy PA glides lumbar spine  Mult bouts     Psoas release hip flexors      MFR LA B   Wand education on the R    Mobs R hip inf and lateral 10 x with belt    Mobs lumbar spine grade 2-3 mult bouts        DKTC   LTR  MFR B LA  Cupping lumbar spine in prone    Education  To stop sitting on her R leg under her-       Therapeutic Exercise Minutes 45  15    Manual Therapy Minutes   30 45   Total Time Of Timed Procedures 45 0 45 45   Total Time Of Service-Based Procedures 0 0 0 0   Total Treatment Time 45 0 45 45        HEP  Stretches at home     Charges  MT 3  Low Complexity

## 2025-04-16 ENCOUNTER — OFFICE VISIT (OUTPATIENT)
Dept: PHYSICAL THERAPY | Age: 60
End: 2025-04-16
Attending: STUDENT IN AN ORGANIZED HEALTH CARE EDUCATION/TRAINING PROGRAM
Payer: COMMERCIAL

## 2025-04-16 PROCEDURE — 97110 THERAPEUTIC EXERCISES: CPT

## 2025-04-16 NOTE — PROGRESS NOTES
Patient: Sherlyn Farnsworth (60 year old, female) Referring Provider:  Insurance:   Diagnosis:   Raghavendra Dale  [place]   Date of Surgery: No data recorded Next MD visit:  N/A   Precautions:    No data recorded Referral Information:    Date of Evaluation: Req: 0, Auth: 0, Exp:     No data recorded POC Auth Visits:  8        Today's Date   4/16/2025    Subjective  I have been feeling pretty good . L SI joint pain still present- I was doing some yard work. 4.10 L joint at its worse, when I move or twist.       Pain: 2/10     Objective  pt can fully contract and relax post pF contraction.  L SI joint with palpation.            Assessment  did progress to PF strengthening today - cues for form and to breathe. pt was able to relax post contractions. pt cannot bend forward from the trunk, has to squat- lack of flexion due to stiffness and pain in the low back- advised to cont with her tennis ball at home.    Goals (to be met in 8 visits)   Pt will be I with HEP  Pt will report ability to bend forward to nilton and doff pants   Pt will be able to demo full contraction of the PF with relaxation post contractions  Pt will demo figure 4 in sitting on the R to nilton and doff socks and shoes  Improve psoas tightness so that pt can ambulate with full ext in the hips   Pt will report no fecal smearing post BM  Pt will report improved frequency of BM  Pt will report no pain with intercourse          ASSESSMENT & PLAN OF CARE:    Sherlyn presents with the following:  increased PF tone today   Signs and symptoms are consistent with patient’s diagnosis.  Sherlyn would benefit from skilled Urogyne Therapy to address the above impairments to meet personal and therapeutic goals outlined below.            Education or treatment limitation:  none   Rehab Potential:good                       Plan  cont with PT - side core walking next appt . standing lateral side walking with PF    Treatment Last 4 Visits  Treatment Day: 6        3/19/2025 3/25/2025 4/9/2025 4/16/2025   Pelvic Treatment   Therapeutic Exercise PPU - 10 x  MFR LA B   Mobs R hip inf and lateral 10 each   Prone mobs lumbar spine grade 2-3 mult bouts    Prayer stretch 2 x 30 secs Ball add with PF 10 x   Add bridge 10 x     Abd GTB with PF     Bridge with GTB abd 10 x  PF  PPT 10 x    Manual Therapy MFR LA B   Wand education on the R    Mobs R hip inf and lateral 10 x with belt    Mobs lumbar spine grade 2-3 mult bouts        DKTC   LTR  MFR B LA  Cupping lumbar spine in prone  SI  joint mobs today -  STM B SI joints in prone  Long axis distraction  L only 20 secs x 3     Clams GTB 10 x each side  with PF   DKTC 20 secs - back to PPT     Log roll side to side mobs    Therapeutic Exercise Minutes  15  45   Manual Therapy Minutes  30 45    Total Time Of Timed Procedures 0 45 45 45   Total Time Of Service-Based Procedures 0 0 0 0   Total Treatment Time 0 45 45 45        HEP  Stretches at home     Charges  EX 3  Low Complexity

## 2025-04-23 ENCOUNTER — OFFICE VISIT (OUTPATIENT)
Dept: PHYSICAL THERAPY | Age: 60
End: 2025-04-23
Attending: STUDENT IN AN ORGANIZED HEALTH CARE EDUCATION/TRAINING PROGRAM
Payer: COMMERCIAL

## 2025-04-23 PROCEDURE — 97110 THERAPEUTIC EXERCISES: CPT

## 2025-04-23 NOTE — PROGRESS NOTES
Patient: Sherlyn Farnsworth (60 year old, female) Referring Provider:  Insurance:   Diagnosis:   Raghavendra Dale  [employer] [place]   Date of Surgery: No data recorded Next MD visit:  N/A   Precautions:    No data recorded Referral Information:    Date of Evaluation: Req: 0, Auth: 0, Exp:     No data recorded POC Auth Visits:  8        Today's Date   4/23/2025    Subjective  I have been doing 5-6 hours of outside yard work. feeling stiff in the L SI joint       Pain: 2/10     Objective  + stiffness in low back / SI L today            Assessment  discussed SI injection in the future if she is still feeling discomfort. can see pain management. pt to stop and stretch more often at home when she is working in the yard and prior to bed    Goals (to be met in 8 visits)   Pt will be I with HEP  Pt will report ability to bend forward to nilton and doff pants   Pt will be able to demo full contraction of the PF with relaxation post contractions  Pt will demo figure 4 in sitting on the R to nilton and doff socks and shoes  Improve psoas tightness so that pt can ambulate with full ext in the hips   Pt will report no fecal smearing post BM  Pt will report improved frequency of BM  Pt will report no pain with intercourse          ASSESSMENT & PLAN OF CARE:    Sherlyn presents with the following:  increased PF tone today   Signs and symptoms are consistent with patient’s diagnosis.  Sherlyn would benefit from skilled Urogyne Therapy to address the above impairments to meet personal and therapeutic goals outlined below.            Education or treatment limitation:  none   Rehab Potential:good                         Plan  cont with PT- core 90 90 progression    Treatment Last 4 Visits  Treatment Day: 7       3/25/2025 4/9/2025 4/16/2025 4/23/2025   Pelvic Treatment   Therapeutic Exercise MFR LA B   Mobs R hip inf and lateral 10 each   Prone mobs lumbar spine grade 2-3 mult bouts    Prayer stretch 2 x 30 secs Ball add with PF 10 x   Add bridge 10 x      Abd GTB with PF     Bridge with GTB abd 10 x  PF  PPT 10 x  Bridge with ball add 10 x 3 sec   LTR 10 x     GTB 10 x 3 bridge     Hip lateral side walking/ front walking with and without PF contractions    Prayer stretch  2 x 30 secs      Manual Therapy DKTC   LTR  MFR B LA  Cupping lumbar spine in prone  SI  joint mobs today -  STM B SI joints in prone  Long axis distraction  L only 20 secs x 3     Clams GTB 10 x each side  with PF   DKTC 20 secs - back to PPT     Log roll side to side mobs  Mobs B hips inf and lateral glides 10 x each side    Mobs B lumbar spine uni and central    Therapeutic Exercise Minutes 15  45 45   Manual Therapy Minutes 30 45     Total Time Of Timed Procedures 45 45 45 45   Total Time Of Service-Based Procedures 0 0 0 0   Total Treatment Time 45 45 45 45        HEP  Stretches at home     Charges  EX 3

## 2025-04-30 ENCOUNTER — OFFICE VISIT (OUTPATIENT)
Dept: PHYSICAL THERAPY | Age: 60
End: 2025-04-30
Attending: STUDENT IN AN ORGANIZED HEALTH CARE EDUCATION/TRAINING PROGRAM
Payer: COMMERCIAL

## 2025-04-30 PROCEDURE — 97110 THERAPEUTIC EXERCISES: CPT

## 2025-04-30 NOTE — PROGRESS NOTES
Patient: Sherlyn Farnsworth (60 year old, female) Referring Provider:  Insurance:   Diagnosis:   Raghavendra Dale  [employer] [place]   Date of Surgery: No data recorded Next MD visit:  N/A   Precautions:    No data recorded Referral Information:    Date of Evaluation: Req: 0, Auth: 0, Exp:     No data recorded POC Auth Visits:  8     Progress Summary  Pt has attended 8 visits in uro PT      Today's Date   4/30/2025    Subjective  wake up stiff, but takes less time time to loosen up , could get up and get my pants on easier. R low back / hip sore.   I can be outside donig my yard work with less pain, fatigued.       Pain:       Objective  stiff R low back with mobs. flexion lumbar 80 degs in standing            Assessment  improved mobility in the spine and pt is able to perform PF contractions without  recontractions. cues for form bending forward. can get pants on in flexed position easier.    Goals (to be met in 8 visits)   Pt will be I with HEP  Pt will report ability to bend forward to nilton and doff pants   Pt will be able to demo full contraction of the PF with relaxation post contractions  Pt will demo figure 4 in sitting on the R to nilton and doff socks and shoes  Improve psoas tightness so that pt can ambulate with full ext in the hips   Pt will report no fecal smearing post BM  Pt will report improved frequency of BM  Pt will report no pain with intercourse          ASSESSMENT & PLAN OF CARE:    Sherlyn presents with the following:  increased PF tone today   Signs and symptoms are consistent with patient’s diagnosis.  Sherlyn would benefit from skilled Urogyne Therapy to address the above impairments to meet personal and therapeutic goals outlined below.            Education or treatment limitation:  none   Rehab Potential:good                           Plan  cont with PT- will cont to increase lumbar ROM and PF strength. Please sign to cont for 4-6 more appts. To cont with PF strength , core and low back strengthening      Treatment Last 4 Visits  Treatment Day: 8       4/9/2025 4/16/2025 4/23/2025 4/30/2025   Pelvic Treatment   Therapeutic Exercise Prayer stretch 2 x 30 secs Ball add with PF 10 x   Add bridge 10 x     Abd GTB with PF     Bridge with GTB abd 10 x  PF  PPT 10 x  Bridge with ball add 10 x 3 sec   LTR 10 x     GTB 10 x 3 bridge     Hip lateral side walking/ front walking with and without PF contractions    Prayer stretch  2 x 30 secs    Bosu step ups and side step with squat 10 x each     GTB donkey kick and fire hydrants 10  x     90/90 -  ball opposite with dead bug     Bridge 1/2 up and down with 10 # weight 10 x       LTR 10 x   PPU 10 x   Manual Therapy MFR B LA  Cupping lumbar spine in prone  SI  joint mobs today -  STM B SI joints in prone  Long axis distraction  L only 20 secs x 3     Clams GTB 10 x each side  with PF   DKTC 20 secs - back to PPT     Log roll side to side mobs  Mobs B hips inf and lateral glides 10 x each side    Mobs B lumbar spine uni and central  R low back / Si joint  Mm  pump piriformis B      Therapeutic Exercise Minutes  45 45    Manual Therapy Minutes 45      Total Time Of Timed Procedures 45 45 45 0   Total Time Of Service-Based Procedures 0 0 0 0   Total Treatment Time 45 45 45 0   HEP    Body mechanics working in her garden         HEP  Body mechanics working in her garden     Charges  EX 3

## 2025-05-07 ENCOUNTER — OFFICE VISIT (OUTPATIENT)
Dept: PHYSICAL THERAPY | Age: 60
End: 2025-05-07
Attending: STUDENT IN AN ORGANIZED HEALTH CARE EDUCATION/TRAINING PROGRAM
Payer: COMMERCIAL

## 2025-05-07 ENCOUNTER — OFFICE VISIT (OUTPATIENT)
Dept: FAMILY MEDICINE CLINIC | Facility: CLINIC | Age: 60
End: 2025-05-07
Payer: COMMERCIAL

## 2025-05-07 VITALS
BODY MASS INDEX: 25.78 KG/M2 | TEMPERATURE: 97 F | HEIGHT: 64 IN | HEART RATE: 60 BPM | RESPIRATION RATE: 16 BRPM | SYSTOLIC BLOOD PRESSURE: 134 MMHG | WEIGHT: 151 LBS | OXYGEN SATURATION: 98 % | DIASTOLIC BLOOD PRESSURE: 70 MMHG

## 2025-05-07 DIAGNOSIS — N30.01 ACUTE CYSTITIS WITH HEMATURIA: Primary | ICD-10-CM

## 2025-05-07 DIAGNOSIS — B37.31 VAGINAL CANDIDA: ICD-10-CM

## 2025-05-07 DIAGNOSIS — R39.9 URINARY SYMPTOM OR SIGN: ICD-10-CM

## 2025-05-07 LAB
APPEARANCE: CLEAR
BILIRUBIN: NEGATIVE
GLUCOSE (URINE DIPSTICK): NEGATIVE MG/DL
KETONES (URINE DIPSTICK): NEGATIVE MG/DL
MULTISTIX LOT#: ABNORMAL NUMERIC
NITRITE, URINE: NEGATIVE
PH, URINE: 6.5 (ref 4.5–8)
PROTEIN (URINE DIPSTICK): NEGATIVE MG/DL
SPECIFIC GRAVITY: 1.01 (ref 1–1.03)
UROBILINOGEN,SEMI-QN: 0.2 MG/DL (ref 0–1.9)

## 2025-05-07 PROCEDURE — 3078F DIAST BP <80 MM HG: CPT | Performed by: NURSE PRACTITIONER

## 2025-05-07 PROCEDURE — 3008F BODY MASS INDEX DOCD: CPT | Performed by: NURSE PRACTITIONER

## 2025-05-07 PROCEDURE — 87086 URINE CULTURE/COLONY COUNT: CPT | Performed by: NURSE PRACTITIONER

## 2025-05-07 PROCEDURE — 97110 THERAPEUTIC EXERCISES: CPT

## 2025-05-07 PROCEDURE — 3075F SYST BP GE 130 - 139MM HG: CPT | Performed by: NURSE PRACTITIONER

## 2025-05-07 PROCEDURE — 99213 OFFICE O/P EST LOW 20 MIN: CPT | Performed by: NURSE PRACTITIONER

## 2025-05-07 PROCEDURE — 81003 URINALYSIS AUTO W/O SCOPE: CPT | Performed by: NURSE PRACTITIONER

## 2025-05-07 RX ORDER — FLUCONAZOLE 150 MG/1
150 TABLET ORAL ONCE
Qty: 1 TABLET | Refills: 1 | Status: SHIPPED | OUTPATIENT
Start: 2025-05-07 | End: 2025-05-07

## 2025-05-07 RX ORDER — NITROFURANTOIN 25; 75 MG/1; MG/1
100 CAPSULE ORAL 2 TIMES DAILY
Qty: 14 CAPSULE | Refills: 0 | Status: SHIPPED | OUTPATIENT
Start: 2025-05-07 | End: 2025-05-14

## 2025-05-07 NOTE — PROGRESS NOTES
Patient: Sherlyn Fanrsworth (60 year old, female) Referring Provider:  Insurance:   Diagnosis:   Raghavendra Dale  [employer] [place]   Date of Surgery: No data recorded Next MD visit:  N/A   Precautions:    No data recorded Referral Information:    Date of Evaluation: Req: 0, Auth: 0, Exp:     No data recorded POC Auth Visits:  12        Today's Date   5/7/2025    Subjective  I feel like I am getting a UTI, going to stop at the clinic after this appt. feeling stiff today.  at night, pain in the low back.       Pain: 0/10     Objective  pt can contract the PF 8-10 sec in bridge- one re squeeze       Musculoskeletal  Posture: Posture: rounded shoulders   Pelvic Alignment: NA   External Palpation: + piriformis R >L.   psoas , ITB   Scars (location/surgery):    Abdominal Wall Integrity: not assessed today   Diastasis Recti:  (NA)         Special Tests:    Informed consent for internal pelvic evaluation given:Yes     External Observation:   Voluntary contraction: -- (not tested due to to PF tightness)   Voluntary relaxation:     Involuntary contraction:     Involuntary relaxation:     Mons pubis: WNL   Labia majora: WNL  Labia minora: WNL   Urethral meatus: WNL   Introitus: WNL   Perineal body: WNL  Anus/External Anal Sphincter: WNL    Internal Examination   Scar:      Pelvic Floor Muscle strength: (PERF= Power/Endurance/Reps/Fast) MMT:  Power Endurance REPS Fast                    Tissue Laxity Test:  Anterior Wall: min  Posterior Wall: WFL  Apical:           Internal Palpation:    Superficial Transverse Perineal  moderate restriction   Bulbospongiosus moderate restriction   Ischiocavernosus moderate restriction   Deep Transverse Perineal moderate restriction   Compress Urethra/Sphincter moderate restriction   Urethrovaginalis moderate restriction   Pubococcygeus moderate restriction   Iliococcygeus moderate restriction   Coccygeus moderate restriction   Piriformis (palpated externally)  moderate restriction   Obturator Internus   moderate restriction   Pelvic Clock moderate restriction     ROM and Strength  (* denotes performed with pain)  Hip   ROM MMT (-/5)    R L R L     Flex (L2) 120 120         Ext              Abd             ER             IR                Flexibility:  LE Flexibility R L     Hip Flexor mod restricted mod restricted     Hamstrings min restricted min restricted     ITB min restricted min restricted     Piriformis mod restricted min restricted     Quads min restricted min restricted     Gastroc-soleus min restricted min restricted     Adductors         Neurological  Deep Tendon Reflexes:    Patella: R:  , L:  ;          Achilles: R:  , L:     Sensory/Reflex:  Vestibule: not tested   Anal Roma: not tested   Cough Reflex:       Balance and Functional Mobility  Gait: pt ambulates on level ground with decreased step length; decreased stance phase.  SLS EO: R      L              Assessment  improved low back and hip mobility today. working on glut strength- hip ext today to help with ant pelvic tilt. cues to avoid lumbar ext in standing .    Goals (to be met in 12 visits)   Pt will be I with HEP  Pt will report ability to bend forward to nilton and doff pants   Pt will be able to demo full contraction of the PF with relaxation post contractions  Pt will demo figure 4 in sitting on the R to nilton and doff socks and shoes  Improve psoas tightness so that pt can ambulate with full ext in the hips   Pt will report no fecal smearing post BM  Pt will report improved frequency of BM  Pt will report no pain with intercourse          ASSESSMENT & PLAN OF CARE:    Sherlyn presents with the following:  increased PF tone today   Signs and symptoms are consistent with patient’s diagnosis.  Sherlyn would benefit from skilled Urogyne Therapy to address the above impairments to meet personal and therapeutic goals outlined below.            Education or treatment limitation:  none   Rehab Potential:good                             Plan  cont with  core and PF strength    Treatment Last 4 Visits  Treatment Day: 9       4/16/2025 4/23/2025 4/30/2025 5/7/2025   Pelvic Treatment   Therapeutic Exercise Ball add with PF 10 x   Add bridge 10 x     Abd GTB with PF     Bridge with GTB abd 10 x  PF  PPT 10 x  Bridge with ball add 10 x 3 sec   LTR 10 x     GTB 10 x 3 bridge     Hip lateral side walking/ front walking with and without PF contractions    Prayer stretch  2 x 30 secs    Bosu step ups and side step with squat 10 x each     GTB donkey kick and fire hydrants 10  x     90/90 -  ball opposite with dead bug     Bridge 1/2 up and down with 10 # weight 10 x       LTR 10 x   PPU 10 x Warm up with ball 10 count in bridge 10 x     Ext stretch on the wall     Clams - ankle elevation  10 x with PF GTB     PPT - supine in hook lying 3 sec holds              Manual Therapy SI  joint mobs today -  STM B SI joints in prone  Long axis distraction  L only 20 secs x 3     Clams GTB 10 x each side  with PF   DKTC 20 secs - back to PPT     Log roll side to side mobs  Mobs B hips inf and lateral glides 10 x each side    Mobs B lumbar spine uni and central  R low back / Si joint  Mm  pump piriformis B    Side lying hip flexor stretch       Post glides femur 10 x each side   Mult walk outs GTB 5 x each side  Sliders for abd work with blue band 10 x 2 each side    PPU 10 x 5 sec holds   Glut ext in prone 10 x each side      Therapeutic Exercise Minutes 45 45  45   Total Time Of Timed Procedures 45 45 0 45   Total Time Of Service-Based Procedures 0 0 0 0   Total Treatment Time 45 45 0 45   HEP   Body mechanics working in her garden          HEP  Body mechanics working in her garden     Charges  EX 3

## 2025-05-07 NOTE — PROGRESS NOTES
CHIEF COMPLAINT:     Chief Complaint   Patient presents with    UTI     1 week, possible uti or yeast infection, burning w/ urination, slight white discharge, no otc       HPI:   Sherlyn Farnsworth is a 60 year old female who presents with symptoms of UTI. Patient reporting symptoms of dysuria for 1 weeks.  Symptoms have been consistent since onset.  Treatments tried: none.  Associated symptoms: noticed a scant amount of clear discharge on underwear.  Patient denies flank pain, fever, hematuria, nausea, or vomiting. Patient denies genital discharge or itching. Patient denies new sexual partners in the last 3 months. Patient denies recent unprotected sexual intercourse.  Patient does have a bladder sling and has been working with PT for pelvic floor dysfunction.    Current Medications[1]   Past Medical History[2]   Social History:  Short Social Hx on File[3]      REVIEW OF SYSTEMS:   GENERAL: See above  GI: See HPI.    : See HPI.      EXAM:   /70   Pulse 60   Temp 97.3 °F (36.3 °C)   Resp 16   Ht 5' 4\" (1.626 m)   Wt 151 lb (68.5 kg)   LMP 10/11/2016   SpO2 98%   Breastfeeding No   BMI 25.92 kg/m²   GENERAL: well developed, well nourished,in no apparent distress  CARDIO: RRR, no murmurs  LUNGS: clear to ausculation bilaterally, no wheezing or rhonchi  GI: BS present x 4.  No hepatosplenomegaly.  : denies suprapubic tenderness. No bladder distention, or CVAT     Recent Results (from the past 24 hours)   URINALYSIS, AUTO, W/O SCOPE    Collection Time: 05/07/25  3:04 PM   Result Value Ref Range    Glucose Urine Negative Negative mg/dL    Bilirubin Urine Negative Negative    Ketones, UA Negative Negative - Trace mg/dL    Spec Gravity 1.010 1.005 - 1.030    Blood Urine Small (A) Negative    PH Urine 6.5 5.0 - 8.0    Protein Urine Negative Negative - Trace mg/dL    Urobilinogen Urine 0.2 0.2 - 1.0 mg/dL    Nitrite Urine Negative Negative    Leukocyte Esterase Urine Trace (A) Negative    APPEARANCE  clear Clear    Color Urine light yellow Yellow    Multistix Lot# [id number] Numeric    Multistix Expiration Date 3/31/26 Date         ASSESSMENT AND PLAN:   Sherlyn Farnsworth is a 60 year old female presents with urinary symptoms.    ASSESSMENT:  Encounter Diagnoses   Name Primary?    Urinary symptom or sign     Acute cystitis with hematuria Yes    Vaginal candida        PLAN: Meds as listed below.  Will cover for UTI and possible early yeast infection.  Declined pelvic exam.  Will follow up with UroGyne if no improvement.  Comfort measures as described in Patient Instructions. will send urine culture.     Meds & Refills for this Visit:  Requested Prescriptions     Signed Prescriptions Disp Refills    nitrofurantoin monohydrate macro 100 MG Oral Cap 14 capsule 0     Sig: Take 1 capsule (100 mg total) by mouth 2 (two) times daily for 7 days.    fluconazole 150 MG Oral Tab 1 tablet 1     Sig: Take 1 tablet (150 mg total) by mouth once for 1 dose. May repeat in 72 hours       Risk and benefits of medication discussed.   Stressed importance of completing full course of antibiotic unless told otherwise.     The patient indicates understanding of these issues and agrees to the plan.  The patient is asked to see PCP in 3 days if not better. Seek care immediately for new onset of fever, vomiting, worsening symptoms.    Patient Instructions   Follow up with UroGyne if no improvement             [1]   Current Outpatient Medications   Medication Sig Dispense Refill    nitrofurantoin monohydrate macro 100 MG Oral Cap Take 1 capsule (100 mg total) by mouth 2 (two) times daily for 7 days. 14 capsule 0    fluconazole 150 MG Oral Tab Take 1 tablet (150 mg total) by mouth once for 1 dose. May repeat in 72 hours 1 tablet 1    rosuvastatin 5 MG Oral Tab Take 1 tablet (5 mg total) by mouth nightly. (Patient not taking: Reported on 5/7/2025) 90 tablet 1    montelukast 10 MG Oral Tab Take 1 tablet (10 mg total) by mouth nightly. (Patient  not taking: Reported on 2025) 90 tablet 0    Meloxicam 15 MG Oral Tab Take 1 tablet (15 mg total) by mouth daily. (Patient not taking: Reported on 2025) 30 tablet 1   [2]   Past Medical History:   Arthritis    Age related    Back pain    Age related    Easy bruising    Always    Night sweats    Age related    Pain in joints    Age related    Papanicolaou smear of cervix with atypical squamous cells of undetermined significance (ASC-US)    Sleep disturbance    Age related    Wears glasses   [3]   Social History  Socioeconomic History    Marital status:    Tobacco Use    Smoking status: Former     Current packs/day: 0.00     Types: Cigarettes     Quit date: 1988     Years since quittin.3    Smokeless tobacco: Never   Vaping Use    Vaping status: Never Used   Substance and Sexual Activity    Alcohol use: Yes     Alcohol/week: 4.0 standard drinks of alcohol     Types: 4 Glasses of wine per week     Comment: occ    Drug use: Never   Other Topics Concern    Caffeine Concern Yes    Exercise Yes

## 2025-05-14 ENCOUNTER — OFFICE VISIT (OUTPATIENT)
Dept: PHYSICAL THERAPY | Age: 60
End: 2025-05-14
Attending: STUDENT IN AN ORGANIZED HEALTH CARE EDUCATION/TRAINING PROGRAM
Payer: COMMERCIAL

## 2025-05-14 PROCEDURE — 97110 THERAPEUTIC EXERCISES: CPT

## 2025-05-15 NOTE — PROGRESS NOTES
Patient: Sherlyn Farnsworth (60 year old, female) Referring Provider:  Insurance:   Diagnosis:   Raghavendra Dale  [employer] [place]   Date of Surgery: No data recorded Next MD visit:  N/A   Precautions:    No data recorded Referral Information:    Date of Evaluation: Req: 0, Auth: 0, Exp:     No data recorded POC Auth Visits:  12        Today's Date   5/14/2025    Subjective  ended up not having a UTI. feeling better today, less back stiffness, could be in the garden with less pain in the low back.       Pain: 0/10     Objective  improved lumbar mobility today. pt was able to maintain the PF contraction for the duration of a bridge today            Assessment  pt making gains. adivsed to monitor the symptoms of the UTI with her back pain. today hip mobility was improved. still feeling still getting off the plinth. discussed using a small stook instead of sitting on one side in her garden    Goals (to be met in 12 visits)   Pt will be I with HEP  Pt will report ability to bend forward to nilton and doff pants   Pt will be able to demo full contraction of the PF with relaxation post contractions  Pt will demo figure 4 in sitting on the R to nilton and doff socks and shoes  Improve psoas tightness so that pt can ambulate with full ext in the hips   Pt will report no fecal smearing post BM  Pt will report improved frequency of BM  Pt will report no pain with intercourse          ASSESSMENT & PLAN OF CARE:    Sherlyn presents with the following:  increased PF tone today   Signs and symptoms are consistent with patient’s diagnosis.  Sherlyn would benefit from skilled Urogyne Therapy to address the above impairments to meet personal and therapeutic goals outlined below.            Education or treatment limitation:  none   Rehab Potential:good                               Plan  cont with step up and lunge next appt    Treatment Last 4 Visits  Treatment Day: 10       4/23/2025 4/30/2025 5/7/2025 5/14/2025   Pelvic Treatment   Therapeutic  Exercise Bridge with ball add 10 x 3 sec   LTR 10 x     GTB 10 x 3 bridge     Hip lateral side walking/ front walking with and without PF contractions    Prayer stretch  2 x 30 secs    Bosu step ups and side step with squat 10 x each     GTB donkey kick and fire hydrants 10  x     90/90 -  ball opposite with dead bug     Bridge 1/2 up and down with 10 # weight 10 x       LTR 10 x   PPU 10 x Warm up with ball 10 count in bridge 10 x     Ext stretch on the wall     Clams - ankle elevation  10 x with PF GTB     PPT - supine in hook lying 3 sec holds            Ball add with bridge with PF 10 x     Bridge with marching 10 x   Clams GTB elevated ankles- up on the elbow 10 x with PF         Reformer 4 cords with PF 10 x 2 squats    Manual Therapy Mobs B hips inf and lateral glides 10 x each side    Mobs B lumbar spine uni and central  R low back / Si joint  Mm  pump piriformis B    Side lying hip flexor stretch       Post glides femur 10 x each side   Mult walk outs GTB 5 x each side  Sliders for abd work with blue band 10 x 2 each side    PPU 10 x 5 sec holds   Glut ext in prone 10 x each side    Mobs inf and lateral 10 x B    Therapeutic Exercise Minutes 45  45 45   Total Time Of Timed Procedures 45 0 45 45   Total Time Of Service-Based Procedures 0 0 0 0   Total Treatment Time 45 0 45 45   HEP  Body mechanics working in her garden           HEP  Body mechanics working in her garden     Charges  EX 3

## 2025-05-21 ENCOUNTER — OFFICE VISIT (OUTPATIENT)
Dept: PHYSICAL THERAPY | Age: 60
End: 2025-05-21
Attending: STUDENT IN AN ORGANIZED HEALTH CARE EDUCATION/TRAINING PROGRAM
Payer: COMMERCIAL

## 2025-05-21 PROCEDURE — 97140 MANUAL THERAPY 1/> REGIONS: CPT

## 2025-05-21 PROCEDURE — 97110 THERAPEUTIC EXERCISES: CPT

## 2025-05-22 NOTE — PROGRESS NOTES
Patient: Sherlyn Farnsworth (60 year old, female) Referring Provider:  Insurance:   Diagnosis:   Raghavendra Dale  [employer] [place]   Date of Surgery: No data recorded Next MD visit:  N/A   Precautions:    No data recorded Referral Information:    Date of Evaluation: Req: 0, Auth: 0, Exp:     No data recorded POC Auth Visits:  12        Today's Date   5/21/2025    Subjective  no burning - urinating normally. company this past weekend, my back was tight and sore again. cannot find a spot sit, stand that makes it feel better. sleeping painful too.       Pain:       Objective  pt still very tender and tight in the lumbar spine. hip flexion / psaos + mod B            Assessment  pt advised to take time to perform PPT and trA when in car and standing in static position. discussed use of the TrA with activities at home. discussed possible referral to pain management of her lumbar spine does not improve. overall , the PF symptoms have improved. pt did feel better post visit today    Goals (to be met in 12 visits)   Pt will be I with HEP  Pt will report ability to bend forward to nilton and doff pants   Pt will be able to demo full contraction of the PF with relaxation post contractions  Pt will demo figure 4 in sitting on the R to nilton and doff socks and shoes  Improve psoas tightness so that pt can ambulate with full ext in the hips   Pt will report no fecal smearing post BM  Pt will report improved frequency of BM  Pt will report no pain with intercourse          ASSESSMENT & PLAN OF CARE:    Sherlyn presents with the following:  increased PF tone today   Signs and symptoms are consistent with patient’s diagnosis.  Sherlyn would benefit from skilled Urogyne Therapy to address the above impairments to meet personal and therapeutic goals outlined below.            Education or treatment limitation:  none   Rehab Potential:good                                 Plan  cont with step up and lunge / core strengthening next appt    Treatment Last  4 Visits  Treatment Day: 11 4/30/2025 5/7/2025 5/14/2025 5/21/2025   Pelvic Treatment   Therapeutic Exercise Bosu step ups and side step with squat 10 x each     GTB donkey kick and fire hydrants 10  x     90/90 -  ball opposite with dead bug     Bridge 1/2 up and down with 10 # weight 10 x       LTR 10 x   PPU 10 x Warm up with ball 10 count in bridge 10 x     Ext stretch on the wall     Clams - ankle elevation  10 x with PF GTB     PPT - supine in hook lying 3 sec holds            Ball add with bridge with PF 10 x     Bridge with marching 10 x   Clams GTB elevated ankles- up on the elbow 10 x with PF         Reformer 4 cords with PF 10 x 2 squats  Ball add with bridge and PF 10 x 3 seconds     LTR 10 x   Prayer stretch   PPU        Manual Therapy R low back / Si joint  Mm  pump piriformis B    Side lying hip flexor stretch       Post glides femur 10 x each side   Mult walk outs GTB 5 x each side  Sliders for abd work with blue band 10 x 2 each side    PPU 10 x 5 sec holds   Glut ext in prone 10 x each side    Mobs inf and lateral 10 x B  Mobs   STM lumbar spine  Mobs B hips inf and lateral 10 x each        Therapeutic Exercise Minutes  45 45 15   Manual Therapy Minutes    30   Total Time Of Timed Procedures 0 45 45 45   Total Time Of Service-Based Procedures 0 0 0 0   Total Treatment Time 0 45 45 45   HEP Body mechanics working in her garden            HEP  Body mechanics working in her garden     Charges  Ex 1 MT 2

## 2025-05-28 ENCOUNTER — OFFICE VISIT (OUTPATIENT)
Dept: PHYSICAL THERAPY | Age: 60
End: 2025-05-28
Attending: STUDENT IN AN ORGANIZED HEALTH CARE EDUCATION/TRAINING PROGRAM
Payer: COMMERCIAL

## 2025-05-28 PROCEDURE — 97110 THERAPEUTIC EXERCISES: CPT

## 2025-05-28 NOTE — PROGRESS NOTES
Patient: Sherlyn Farnsworth (60 year old, female) Referring Provider:  Insurance:   Diagnosis:   Raghavendra Dale  [employer]   Date of Surgery: No data recorded Next MD visit:  N/A   Precautions:    No data recorded Referral Information:    Date of Evaluation: Req: 0, Auth: 0, Exp:     No data recorded POC Auth Visits:  12     Discharge Summary  Pt has attended 12 visits in Physical Therapy.      Today's Date   5/28/2025    Subjective  seeing MD tomorrow. my back is really bothering me still. taking liquid ibuprofen.       Pain:       Objective  R facet tightness and lumbar spine restrictions uni and central R > L. pt can hold PF 10 sec contractions for improved symptoms            Assessment  pt has met her goals at this time for the pelvic floor and will cont with her HEP- seeing MD for continued back pain . better today post lumbar and hip mobilization    Goals (to be met in 12 visits)   Pt will be I with HEP  Pt will report ability to bend forward to nilton and doff pants   Pt will be able to demo full contraction of the PF with relaxation post contractions  Pt will demo figure 4 in sitting on the R to nilton and doff socks and shoes  Improve psoas tightness so that pt can ambulate with full ext in the hips   Pt will report no fecal smearing post BM  Pt will report improved frequency of BM  Pt will report no pain with intercourse          ASSESSMENT & PLAN OF CARE:    Sherlyn presents with the following:  increased PF tone today   Signs and symptoms are consistent with patient’s diagnosis.  Sherlyn would benefit from skilled Urogyne Therapy to address the above impairments to meet personal and therapeutic goals outlined below.            Education or treatment limitation:  none   Rehab Potential:good                                   Plan  discharge with HEP. pt has follow up regarding continued back pain. pt has met her pelvic floor goals.    Treatment Last 4 Visits  Treatment Day: 12       5/7/2025 5/14/2025 5/21/2025  5/28/2025   Pelvic Treatment   Therapeutic Exercise Warm up with ball 10 count in bridge 10 x     Ext stretch on the wall     Clams - ankle elevation  10 x with PF GTB     PPT - supine in hook lying 3 sec holds            Ball add with bridge with PF 10 x     Bridge with marching 10 x   Clams GTB elevated ankles- up on the elbow 10 x with PF         Reformer 4 cords with PF 10 x 2 squats  Ball add with bridge and PF 10 x 3 seconds     LTR 10 x   Prayer stretch   PPU      LTR 10 x   Bent forward stretch 2 x 30 secs    Manual Therapy Side lying hip flexor stretch       Post glides femur 10 x each side   Mult walk outs GTB 5 x each side  Sliders for abd work with blue band 10 x 2 each side    PPU 10 x 5 sec holds   Glut ext in prone 10 x each side    Mobs inf and lateral 10 x B  Mobs   STM lumbar spine  Mobs B hips inf and lateral 10 x each      STM B paraspinals  Mobs uni and central mobs mult bouts grade 2-3 mult bouts      Therapeutic Exercise Minutes 45 45 15 45   Manual Therapy Minutes   30    Total Time Of Timed Procedures 45 45 45 45   Total Time Of Service-Based Procedures 0 0 0 0   Total Treatment Time 45 45 45 45        HEP  Body mechanics working in her garden     Charges  EX 3

## 2025-05-29 ENCOUNTER — OFFICE VISIT (OUTPATIENT)
Dept: FAMILY MEDICINE CLINIC | Facility: CLINIC | Age: 60
End: 2025-05-29
Payer: COMMERCIAL

## 2025-05-29 VITALS
SYSTOLIC BLOOD PRESSURE: 126 MMHG | HEIGHT: 64 IN | DIASTOLIC BLOOD PRESSURE: 78 MMHG | OXYGEN SATURATION: 97 % | RESPIRATION RATE: 20 BRPM | BODY MASS INDEX: 25.78 KG/M2 | WEIGHT: 151 LBS | HEART RATE: 73 BPM

## 2025-05-29 DIAGNOSIS — Z00.00 ROUTINE GENERAL MEDICAL EXAMINATION AT A HEALTH CARE FACILITY: ICD-10-CM

## 2025-05-29 DIAGNOSIS — R39.9 URINARY SYMPTOM OR SIGN: Primary | ICD-10-CM

## 2025-05-29 DIAGNOSIS — R29.4 CLICKING OF RIGHT HIP: ICD-10-CM

## 2025-05-29 DIAGNOSIS — R53.83 FATIGUE, UNSPECIFIED TYPE: ICD-10-CM

## 2025-05-29 DIAGNOSIS — M89.8X9 BONE PAIN: ICD-10-CM

## 2025-05-29 DIAGNOSIS — E56.9 VITAMIN DEFICIENCY: ICD-10-CM

## 2025-05-29 DIAGNOSIS — M51.360 DEGENERATION OF INTERVERTEBRAL DISC OF LUMBAR REGION WITH DISCOGENIC BACK PAIN: ICD-10-CM

## 2025-05-29 LAB
APPEARANCE: CLEAR
BILIRUBIN: NEGATIVE
GLUCOSE (URINE DIPSTICK): NEGATIVE MG/DL
KETONES (URINE DIPSTICK): NEGATIVE MG/DL
MULTISTIX LOT#: ABNORMAL NUMERIC
NITRITE, URINE: NEGATIVE
PH, URINE: 5.5 (ref 4.5–8)
PROTEIN (URINE DIPSTICK): NEGATIVE MG/DL
SPECIFIC GRAVITY: >=1.03 (ref 1–1.03)
URINE-COLOR: YELLOW
UROBILINOGEN,SEMI-QN: 0.2 MG/DL (ref 0–1.9)

## 2025-05-29 PROCEDURE — 3078F DIAST BP <80 MM HG: CPT | Performed by: INTERNAL MEDICINE

## 2025-05-29 PROCEDURE — 3008F BODY MASS INDEX DOCD: CPT | Performed by: INTERNAL MEDICINE

## 2025-05-29 PROCEDURE — 3074F SYST BP LT 130 MM HG: CPT | Performed by: INTERNAL MEDICINE

## 2025-05-29 PROCEDURE — 99214 OFFICE O/P EST MOD 30 MIN: CPT | Performed by: INTERNAL MEDICINE

## 2025-05-29 PROCEDURE — 81003 URINALYSIS AUTO W/O SCOPE: CPT | Performed by: INTERNAL MEDICINE

## 2025-05-29 RX ORDER — ESTRADIOL 0.1 MG/G
CREAM VAGINAL
Qty: 42.5 G | Refills: 3 | Status: SHIPPED | OUTPATIENT
Start: 2025-05-29

## 2025-05-29 NOTE — PROGRESS NOTES
The following individual(s) verbally consented to be recorded using ambient AI listening technology and understand that they can each withdraw their consent to this listening technology at any point by asking the clinician to turn off or pause the recording:    Patient name: Sherlyn Farnsworth    History of Present Illness  Sherlyn Farnsworth is a 60 year old female with lumbar degenerative disc disease who presents with chronic back pain and pelvic discomfort.    She has chronic back pain, initially diagnosed as degenerative disc disease or arthritis following an x-ray. Despite physiotherapy, her symptoms have not improved. The pain is described as a grinding sensation in her hip that sometimes locks up, causing significant discomfort during activities such as walking and gardening.    In addition to back pain, she experiences pelvic discomfort, described as a sensation similar to a misplaced tampon, despite having had a hysterectomy. This discomfort is sometimes accompanied by a feeling of pressure and burning, initially thought to be a bladder infection. However, urine cultures have returned negative for infection. She has undergone pelvic floor therapy following an abnormal urine sample, suspected to be contaminated, but continues to experience discomfort and burning sensations without a confirmed infection. Started on estradiol vaginal by urogyne.    She reports fatigue and difficulty sleeping, often waking between 2 and 3 AM. She experiences sharp, shooting pain in her shoulder and describes a general feeling of being unwell. She has a history of hip dysplasia concerns from childhood, but recent x-rays showed no acute issues with her hips.    She has been using coping mechanisms and exercises learned in therapy, but her symptoms fluctuate, with periods of feeling well followed by significant discomfort. She has a history of a hysterectomy and bladder sling surgery, which she feels may have contributed to her  current pelvic issues.    Her family history includes concerns about leukemia, as both her mother and cousin were diagnosed around her age. She is not currently taking any regular medications but uses a green juice supplement occasionally.      The 21st Century Cures Act makes medical notes like these available to patients in the interest of transparency. Please be advised this is a medical document. Medical documents are intended to carry relevant information, facts as evident, and the clinical opinion of the practitioner. The medical note is intended as peer to peer communication and may appear blunt or direct. It is written in medical language and may contain abbreviations or verbiage that are unfamiliar.       Current Medications[1]   Past Medical History[2]   Social History:  Short Social Hx on File[3]     REVIEW OF SYSTEMS:   GENERAL HEALTH: feels well otherwise  SKIN: denies any unusual skin lesions or rashes  RESPIRATORY: denies shortness of breath or cough  CARDIOVASCULAR: denies chest pain or pressure  GI: denies N/V/D; denies abdominal pain    :  vaginal discomfort, urinary irritation as above; seeing Urogyne  MS: hip and back pain  NEURO: denies headaches, denies dizziness; denies numbness or tingling    EXAM:   /78   Pulse 73   Resp 20   Ht 5' 4\" (1.626 m)   Wt 151 lb (68.5 kg)   LMP 10/11/2016   SpO2 97%   BMI 25.92 kg/m²   GENERAL: well developed, well nourished,in no apparent distress  SKIN: warm & dry  HEENT: atraumatic, normocephalic, TMs clear, throat clear  NECK: supple,no adenopathy   LUNGS: CTA, easy breathing  CV: normal S1S2, RRR without murmur  GI: good BS's,no masses, HSM or tenderness  : no greater trochanter TTP; + crepitus with hip rotation; no lumbar spinal or paraspinal TTP  EXT: no cyanosis, clubbing or edema    ASSESSMENT AND PLAN:     1. Urinary symptom or sign    2. Fatigue, unspecified type    3. Bone pain    4. Degeneration of intervertebral disc of lumbar region  with discogenic back pain    5. Clicking of right hip    6. Vitamin deficiency    7. Routine general medical examination at a health care facility        Orders Placed This Encounter   Procedures    Urine Dip, auto without Micro    Iron, Serum [E]    Ferritin [E]    CBC With Differential With Platelet    Cortisol [E]    Comp Metabolic Panel (14)    TSH and Free T4 [E]    Lipid Panel    Vitamin B12 [E]    Vitamin D [E]    Urine Culture, Routine     OP REFERRAL PAIN MANGEMENT      Medications    estradiol 0.1 MG/GM Vaginal Cream       The patient indicates understanding of these issues and agrees to the plan.  Follow up prn.         [1]   Current Outpatient Medications   Medication Sig Dispense Refill    estradiol 0.1 MG/GM Vaginal Cream 2gm PV nightly x 2 weeks, then 2gm 2x/weekly. 42.5 g 3   [2]   Past Medical History:   Arthritis    Age related    Back pain    Age related    Easy bruising    Always    Night sweats    Age related    Pain in joints    Age related    Papanicolaou smear of cervix with atypical squamous cells of undetermined significance (ASC-US)    Sleep disturbance    Age related    Wears glasses   [3]   Social History  Socioeconomic History    Marital status:    Tobacco Use    Smoking status: Former     Current packs/day: 0.00     Types: Cigarettes     Quit date: 1988     Years since quittin.4    Smokeless tobacco: Never   Vaping Use    Vaping status: Never Used   Substance and Sexual Activity    Alcohol use: Yes     Alcohol/week: 4.0 standard drinks of alcohol     Types: 4 Glasses of wine per week     Comment: occ    Drug use: Never   Other Topics Concern    Caffeine Concern Yes    Exercise Yes

## 2025-05-30 ENCOUNTER — LAB ENCOUNTER (OUTPATIENT)
Dept: LAB | Age: 60
End: 2025-05-30
Attending: INTERNAL MEDICINE
Payer: COMMERCIAL

## 2025-05-30 DIAGNOSIS — E56.9 VITAMIN DEFICIENCY: ICD-10-CM

## 2025-05-30 DIAGNOSIS — R53.83 FATIGUE, UNSPECIFIED TYPE: ICD-10-CM

## 2025-05-30 DIAGNOSIS — R39.9 URINARY SYMPTOM OR SIGN: ICD-10-CM

## 2025-05-30 DIAGNOSIS — Z00.00 ROUTINE GENERAL MEDICAL EXAMINATION AT A HEALTH CARE FACILITY: ICD-10-CM

## 2025-05-30 DIAGNOSIS — M89.8X9 BONE PAIN: ICD-10-CM

## 2025-05-30 LAB
ALBUMIN SERPL-MCNC: 4.6 G/DL (ref 3.2–4.8)
ALBUMIN/GLOB SERPL: 1.8 {RATIO} (ref 1–2)
ALP LIVER SERPL-CCNC: 83 U/L (ref 50–130)
ALT SERPL-CCNC: 20 U/L (ref 10–49)
ANION GAP SERPL CALC-SCNC: 9 MMOL/L (ref 0–18)
AST SERPL-CCNC: 30 U/L (ref ?–34)
BASOPHILS # BLD AUTO: 0.05 X10(3) UL (ref 0–0.2)
BASOPHILS NFR BLD AUTO: 0.8 %
BILIRUB SERPL-MCNC: 0.5 MG/DL (ref 0.2–1.1)
BUN BLD-MCNC: 18 MG/DL (ref 9–23)
CALCIUM BLD-MCNC: 9.9 MG/DL (ref 8.7–10.6)
CHLORIDE SERPL-SCNC: 105 MMOL/L (ref 98–112)
CHOLEST SERPL-MCNC: 213 MG/DL (ref ?–200)
CO2 SERPL-SCNC: 29 MMOL/L (ref 21–32)
CORTIS SERPL-MCNC: 13.5 UG/DL
CREAT BLD-MCNC: 0.89 MG/DL (ref 0.55–1.02)
DEPRECATED HBV CORE AB SER IA-ACNC: 94 NG/ML (ref 50–306)
EGFRCR SERPLBLD CKD-EPI 2021: 74 ML/MIN/1.73M2 (ref 60–?)
EOSINOPHIL # BLD AUTO: 0.1 X10(3) UL (ref 0–0.7)
EOSINOPHIL NFR BLD AUTO: 1.6 %
ERYTHROCYTE [DISTWIDTH] IN BLOOD BY AUTOMATED COUNT: 13.7 %
FASTING PATIENT LIPID ANSWER: YES
FASTING STATUS PATIENT QL REPORTED: YES
GLOBULIN PLAS-MCNC: 2.6 G/DL (ref 2–3.5)
GLUCOSE BLD-MCNC: 79 MG/DL (ref 70–99)
HCT VFR BLD AUTO: 40 % (ref 35–48)
HDLC SERPL-MCNC: 70 MG/DL (ref 40–59)
HGB BLD-MCNC: 12.8 G/DL (ref 12–16)
IMM GRANULOCYTES # BLD AUTO: 0.03 X10(3) UL (ref 0–1)
IMM GRANULOCYTES NFR BLD: 0.5 %
IRON SERPL-MCNC: 79 UG/DL (ref 50–170)
LDLC SERPL CALC-MCNC: 132 MG/DL (ref ?–100)
LYMPHOCYTES # BLD AUTO: 2.16 X10(3) UL (ref 1–4)
LYMPHOCYTES NFR BLD AUTO: 34.5 %
MCH RBC QN AUTO: 30.1 PG (ref 26–34)
MCHC RBC AUTO-ENTMCNC: 32 G/DL (ref 31–37)
MCV RBC AUTO: 94.1 FL (ref 80–100)
MONOCYTES # BLD AUTO: 0.38 X10(3) UL (ref 0.1–1)
MONOCYTES NFR BLD AUTO: 6.1 %
NEUTROPHILS # BLD AUTO: 3.54 X10 (3) UL (ref 1.5–7.7)
NEUTROPHILS # BLD AUTO: 3.54 X10(3) UL (ref 1.5–7.7)
NEUTROPHILS NFR BLD AUTO: 56.5 %
NONHDLC SERPL-MCNC: 143 MG/DL (ref ?–130)
OSMOLALITY SERPL CALC.SUM OF ELEC: 297 MOSM/KG (ref 275–295)
PLATELET # BLD AUTO: 212 10(3)UL (ref 150–450)
POTASSIUM SERPL-SCNC: 4.8 MMOL/L (ref 3.5–5.1)
PROT SERPL-MCNC: 7.2 G/DL (ref 5.7–8.2)
RBC # BLD AUTO: 4.25 X10(6)UL (ref 3.8–5.3)
SODIUM SERPL-SCNC: 143 MMOL/L (ref 136–145)
T4 FREE SERPL-MCNC: 1.2 NG/DL (ref 0.8–1.7)
TRIGL SERPL-MCNC: 62 MG/DL (ref 30–149)
TSI SER-ACNC: 2.94 UIU/ML (ref 0.55–4.78)
VIT B12 SERPL-MCNC: 328 PG/ML (ref 211–911)
VIT D+METAB SERPL-MCNC: 34 NG/ML (ref 30–100)
VLDLC SERPL CALC-MCNC: 11 MG/DL (ref 0–30)
WBC # BLD AUTO: 6.3 X10(3) UL (ref 4–11)

## 2025-05-30 PROCEDURE — 82728 ASSAY OF FERRITIN: CPT

## 2025-05-30 PROCEDURE — 87077 CULTURE AEROBIC IDENTIFY: CPT

## 2025-05-30 PROCEDURE — 80061 LIPID PANEL: CPT

## 2025-05-30 PROCEDURE — 82533 TOTAL CORTISOL: CPT

## 2025-05-30 PROCEDURE — 84439 ASSAY OF FREE THYROXINE: CPT

## 2025-05-30 PROCEDURE — 85025 COMPLETE CBC W/AUTO DIFF WBC: CPT

## 2025-05-30 PROCEDURE — 83540 ASSAY OF IRON: CPT

## 2025-05-30 PROCEDURE — 82306 VITAMIN D 25 HYDROXY: CPT

## 2025-05-30 PROCEDURE — 87086 URINE CULTURE/COLONY COUNT: CPT

## 2025-05-30 PROCEDURE — 36415 COLL VENOUS BLD VENIPUNCTURE: CPT

## 2025-05-30 PROCEDURE — 82607 VITAMIN B-12: CPT

## 2025-05-30 PROCEDURE — 80053 COMPREHEN METABOLIC PANEL: CPT

## 2025-05-30 PROCEDURE — 84443 ASSAY THYROID STIM HORMONE: CPT

## 2025-06-02 RX ORDER — MONTELUKAST SODIUM 10 MG/1
10 TABLET ORAL NIGHTLY
Qty: 90 TABLET | Refills: 0 | OUTPATIENT
Start: 2025-06-02

## 2025-06-02 RX ORDER — MELOXICAM 15 MG/1
15 TABLET ORAL DAILY
Qty: 30 TABLET | Refills: 1 | OUTPATIENT
Start: 2025-06-02

## 2025-06-02 RX ORDER — CEPHALEXIN 500 MG/1
500 CAPSULE ORAL 3 TIMES DAILY
Qty: 30 CAPSULE | Refills: 0 | Status: SHIPPED | OUTPATIENT
Start: 2025-06-02 | End: 2025-06-12

## 2025-06-04 ENCOUNTER — APPOINTMENT (OUTPATIENT)
Dept: PHYSICAL THERAPY | Age: 60
End: 2025-06-04
Attending: STUDENT IN AN ORGANIZED HEALTH CARE EDUCATION/TRAINING PROGRAM
Payer: COMMERCIAL

## 2025-06-11 ENCOUNTER — APPOINTMENT (OUTPATIENT)
Dept: PHYSICAL THERAPY | Age: 60
End: 2025-06-11
Attending: STUDENT IN AN ORGANIZED HEALTH CARE EDUCATION/TRAINING PROGRAM
Payer: COMMERCIAL

## 2025-07-25 ENCOUNTER — OFFICE VISIT (OUTPATIENT)
Dept: UROLOGY | Facility: HOSPITAL | Age: 60
End: 2025-07-25
Attending: OBSTETRICS & GYNECOLOGY
Payer: COMMERCIAL

## 2025-07-25 VITALS — WEIGHT: 151 LBS | RESPIRATION RATE: 18 BRPM | HEIGHT: 64 IN | BODY MASS INDEX: 25.78 KG/M2

## 2025-07-25 DIAGNOSIS — N81.84 PELVIC MUSCLE WASTING: ICD-10-CM

## 2025-07-25 DIAGNOSIS — Z98.890 POST-OPERATIVE STATE: ICD-10-CM

## 2025-07-25 DIAGNOSIS — N95.2 POSTMENOPAUSAL ATROPHIC VAGINITIS: Primary | ICD-10-CM

## 2025-07-25 DIAGNOSIS — N94.10 DYSPAREUNIA, FEMALE: ICD-10-CM

## 2025-07-25 LAB
BILIRUB UR QL: NEGATIVE
CLARITY UR: CLEAR
CONTROL RUN WITHIN 24 HOURS?: YES
GLUCOSE UR-MCNC: NORMAL MG/DL
HGB UR QL STRIP.AUTO: NEGATIVE
KETONES UR-MCNC: NEGATIVE MG/DL
LEUKOCYTE ESTERASE UR QL STRIP.AUTO: NEGATIVE
LEUKOCYTE ESTERASE URINE: NEGATIVE
NITRITE UR QL STRIP.AUTO: NEGATIVE
NITRITE URINE: NEGATIVE
PH UR: 6 [PH] (ref 5–8)
PROT UR-MCNC: NEGATIVE MG/DL
SP GR UR STRIP: 1.02 (ref 1–1.03)
UROBILINOGEN UR STRIP-ACNC: NORMAL

## 2025-07-25 PROCEDURE — 99212 OFFICE O/P EST SF 10 MIN: CPT

## 2025-07-25 PROCEDURE — 87086 URINE CULTURE/COLONY COUNT: CPT | Performed by: OBSTETRICS & GYNECOLOGY

## 2025-07-25 PROCEDURE — 81002 URINALYSIS NONAUTO W/O SCOPE: CPT | Performed by: OBSTETRICS & GYNECOLOGY

## 2025-07-25 PROCEDURE — 81003 URINALYSIS AUTO W/O SCOPE: CPT | Performed by: OBSTETRICS & GYNECOLOGY

## 2025-07-25 NOTE — PROGRESS NOTES
She is s/p TVH USVVS APE Repair, MUS, cysto 6/21/21    Last seen 2021  Was seen [facility] at [place], hi tone pelvic floor & bowel dysfunction    She presents with abd pulling & cramping complaints  She reports trace blood on urine testing with UTI    No MATTHIAS  No UUI  She c/o vag pressure    Sexually active with dyspareunia    H/o vag estrogen use, inconsistent    5/25 + UTI  5/30/25 Cx: 10-50k GBS   Tx Keflex 500 mg TID x 10 days     5/7/25 No growth     1/7/25 10-50 GBS+   No s/sx of UTI currently    Bowels irreg    No gross hematuria    Went to PT recently & reports 50% improvement    Biggest bother is pain with lifiting    Resp 18   Ht 64\"   Wt 151 lb (68.5 kg)   LMP 10/11/2016   BMI 25.92 kg/m²     Gen: NAD  CV:RRR  Pulm:nl effort  Abd:soft  : tolerated vaginal exam. Suture site well healed. No active bleeding. Good support  Nontender  PROLAPSE ASSESSMENT SCALE:      Aa:-2.5 Ba:-2.5 C:-9   gh: pb: tvl:9   Ap:-3 Bp:-3 D:     Examined with mirror to demonstrate to pt    Discussed mgmt of vulvovaginal atrophy with vaginal estrogen cream. Reviewed associated benefits, risks, alternatives, and goals. Recommend low dose twice weekly mgmt     Reviewed bowel management  Discussed daily pelvic exercises, info provided  PFPT prn  Follow urine testing, hematuria w/u as appropriate pending results    Call with s/sx of UTI  Plan for follow up in 3 months, sooner prn  If MSK sx persist consider more PT or PM&R    All questions answered  She understands and agrees to plan    Mary Kay Hu DO

## (undated) DEVICE — EXOFIN TISSUE ADHESIVE 1.0ML

## (undated) DEVICE — SUTURE VICRYL 2-0 CT-1

## (undated) DEVICE — STERILE POLYISOPRENE POWDER-FREE SURGICAL GLOVES: Brand: PROTEXIS

## (undated) DEVICE — SUTURE VICRYL 0 CT-1

## (undated) DEVICE — RETRACTOR LONE STAR STAYS LG

## (undated) DEVICE — #10 STERILE BLADE: Brand: POLYMER COATED BLADES

## (undated) DEVICE — REM POLYHESIVE ADULT PATIENT RETURN ELECTRODE: Brand: VALLEYLAB

## (undated) DEVICE — COVER,MAYO STAND,STERILE: Brand: MEDLINE

## (undated) DEVICE — SCD SLEEVE KNEE HI BLEND

## (undated) DEVICE — BAG DRAIN INFECTION CNTRL 2000

## (undated) DEVICE — NEEDLE SPINAL 22X3-1/2 BLK

## (undated) DEVICE — SUTURE VICRYL 1 CT-1

## (undated) DEVICE — STANDARD HYPODERMIC NEEDLE,POLYPROPYLENE HUB: Brand: MONOJECT

## (undated) DEVICE — VIOLET BRAIDED (POLYGLACTIN 910), SYNTHETIC ABSORBABLE SUTURE: Brand: COATED VICRYL

## (undated) DEVICE — SOL  .9 1000ML BAG

## (undated) DEVICE — CAUTERY PENCIL

## (undated) DEVICE — Device

## (undated) DEVICE — #15 STERILE STAINLESS BLADE: Brand: STERILE STAINLESS BLADES

## (undated) DEVICE — STERILE POLYISOPRENE POWDER-FREE SURGICAL GLOVES WITH EMOLLIENT COATING: Brand: PROTEXIS

## (undated) DEVICE — SOL  .9 1000ML BTL

## (undated) DEVICE — GYN CDS: Brand: MEDLINE INDUSTRIES, INC.

## (undated) DEVICE — MEDI-VAC NON-CONDUCTIVE SUCTION TUBING: Brand: CARDINAL HEALTH

## (undated) DEVICE — TUBING CYSTO

## (undated) DEVICE — SPONGE STICK WITH PVP-I: Brand: KENDALL

## (undated) NOTE — Clinical Note
Sharee Skiff would like to combo hyst with vag reconstruction and her plastic surgery. My portion is  min, all vag, overnight stay, and six weeks post op recovery. Reasonable to combine cases?  Thanks, [employer]

## (undated) NOTE — MR AVS SNAPSHOT
[address]  [address] [address] [phone]               Thank you for choosing us for your health care visit with Cora Mascorro.   We are glad to serve you and happy to provide you with this These medications were sent to [place] [address], [address] [facility] AT [address] [facility], [phone], [phone]  [address] [place] [address], [facility] [address]     Phone:  [phone]    - Amoxicillin-Pot Clavulanate 875-125 MG Tabs  - benzon

## (undated) NOTE — LETTER
08/26/19        [place] [address]      Dear Haydee Dave,    [facility] records indicate that you have outstanding lab work and or testing that was ordered for you and has not yet been completed:  Orders Placed This Encount

## (undated) NOTE — Clinical Note
Asim Casper, she wants to move forward with surgery. Likely combo with plastics. We'll work to coordinate surgery. Thanks!  Aixa Sood

## (undated) NOTE — LETTER
Mary Farnsworth, :1965    CONSENT FOR PROCEDURE/SEDATION    1. I authorize the performance upon Jailene Marie  the following: cervical polyp    2.  I authorize Dr. Brando Van MD (and whomever is designated as the doctor’s assistant), Witness: _________________________________________ Date:___________     Physician Signature: _______________________________ Date:___________

## (undated) NOTE — MR AVS SNAPSHOT
[address]  [address] [address] [phone]               Thank you for choosing us for your health care visit with Auston Landau, NP.   We are glad to serve you and happy to provide you with Normal Orders This Visit    [facility] - INTERNAL [[id number] CUSTOM]  Order #:  [id number]         **REFERRAL REQUEST** Your physician has referred you to a specialist.  Your physician or the clinic staff will provide you with the phone number you should call to Other     Weight Problem           Medical Issues Discussed Today     Irregular intermenstrual bleeding    -  Primary    Thrush          Instructions and Information about Your Health     None      Allergies as of Jun 05, 2017     No Known Allergies Enjoy your food, but eat less. Fully enjoy your food when eating. Don’t eat while distracted and slow down. Avoid over sized portions. Don’t eat while when you’re bored.      EAT THESE FOODS MORE OFTEN: EAT THESE FOODS LESS OFTEN:   Make half your pl

## (undated) NOTE — LETTER
Patient Name: Sherlyn Farnsworth  YOB: 1965          MRN :  [id number]  Date:  2/7/2025  Referring Physician:  Raghavendra Dale    Progress/Discharge Summary  Pt has attended 7 visits in Physical Therapy.     Diagnosis:   Degeneration of intervertebral disc of lumbar region with discogenic back pain         Referring Provider: Raghavendra Dale  Date of Evaluation:    1/7/25    Precautions:  None Next MD visit:   none scheduled  Date of Surgery: n/a   Insurance Primary/Secondary: BCBS OUT OF STATE / N/A     # Auth Visits: 10 per POC            Subjective: Patient states that she has been working on her updated HEP for the past week and while pain in the morning upon waking is the same, she is able to get moving faster with doing the lateral glides at the wall. She notices that she is not as uncomfortable in sitting, including yesterday having to spend several hours in the car. Not having as many episodes of debilitating pain and typically not as severe. There is still pain and locking up with lumbar flexion and rotation, but not constant and after last session, lumbar rotation was better for several days. Overall, feels 60-70% improved since SOC. She feels that she is able to contract her abdominals/is in tune with those mm when she was not prior to PT. She feels that she will likely continue to improve with continued HEP, going to take a break from this POC so that she can begin pelvic floor PT and will return as needed in the future.     Pain: 0-1/10 current; can still have instances of sharp and debilitating pain      Objective:   (1/9/25):  Flexibility:  Cesar Test: negative LISSET, mild tightness ipsilateral hip from knee to chest  Prone quad: heel to glute LISSET, minor increase in feeling of quad tightness L as compared to R    (1/21/25):  Lumbar flexion AROM: fingertips to medial malleoli, pain and tightness with flexion, minimal with return to stand; if initially very tight and painful, can improve with  reps    (1/31/25):  Repeated motions:   Lateral lumbar glide towards L in neutral spine: no increased pain, but resulted in increased pain/difficulty bending over  Lateral lumbar glide towards L with spine flexion: no increased pain, but resulted in increased pain/difficulty bending over  Lumbar lateral glide towards R in neutral spine: no pain, improved ability to perform lumbar flexion following     Accessory motion: min hypomobile L2-4 central and R/L unilateral with min-mod pain; mod-max hypomobile with inc pain central L4-5 and R unilateral; normalized lumbar rotation LISSET following mobilization L4-5    (2/7/25):  Lumbar AROM: (* denotes performed with pain)  Flexion: fingertips to floor with effort, flattened lumbar spine, pain with flexion and return to standing, generally belt line and into LISSET lateral hips  Extension: 28 deg, pain at L4-5  Sidebending: WNL LISSET, reports R-sided pinching pain with L SB  Rotation: WNL LISSET, initial tightness resolves with reps     Accessory motion: min hypomobile L2-4 central and R/L unilateral with min-mod pain; mod-max hypomobile with inc pain central L4-5 and R unilateral; normalized lumbar rotation LISSET following mobilization L4-5    Strength: (* denotes performed with pain)  LE   Hip flexion (L2): 5/5 LISSET  Hip abduction: R 4+/5; L 5/5  Hip Extension: R 4+/5 with R-sided pain; L 4+/5 with L-sided pain (unable to contract glutes individually or without co-contraction lumbar)  Knee Flexion: R 5/5; L 5/5            Knee extension (L3): 5/5 LISSET           DF (L4): R 5/5; L 5/5  Abdominals: 4/5      Squat lift: able to perform 30# assisted lift on cable machine with LBP, improving glute recruitment and decreasing pain with reps/education      Assessment: Patient has made progress with skilled PT intervention since SOC, but pain with lumbar flexion and lifting moderate to heavy loads remains. She demonstrates improved LE strength throughout now without pain to manual resistance with  exception of glutes; patient with difficulty recruiting and conchita glute max without co-contraction LISSET glutes and lumbar paraspinals resulting in pain. Education and NR to improve recruitment and control; progressed to performing with functional squatting and lifting, patient to self-progress as continues with IND HEP as she currently will put this POC on hold to complete pelvic floor PT. Significantly improved ability to perform abdominal recruitment and contraction, but not yet progressed to all functional activity. At this time, patient has made good progress, but has not yet met all goals, will be placed on hold per plan as listed below.       Goals:  (To be met in 10 visits)   Patient will improve abdominal strength to at least 4+/5 to improve control with all normal activity. - progressing  Patient will improve glute max and glute med strength to at least 4+/5 LISSET. - MET strength, but painful  Patient will demonstrate ability to bend over and return to standing without pain. - progressing  Patient will demonstrate ability to lift at least 20# from the floor with proper mechanics and without pain to be able to lift cat and grandchildren. - progressing  Patient will be IND and compliant in HEP to maintain progress made in PT. - MET      Plan: Patient to hold from formal PT intervention with continued IND HEP to allow her to participate in pelvic floor rehab. Should patient note regression or other need to return to this POC within dates listed below, may return as appropriate. If do not hear from patient in that time, will consider this date D/C from PT.     Patient/Family/Caregiver was advised of these findings, precautions, and treatment options and has agreed to actively participate in planning and for this course of care.    Thank you for your referral. If you have any questions, please contact me at Dept: [phone].    Sincerely,  Electronically signed by therapist: Luly Silva, PT, DPT      Physician's certification required:  No    Certification From: 2/7/2025  To:5/8/2025 21st Century Cures Act Notice to Patient: Medical documents like this are made available to patients in the interest of transparency. However, be advised this is a medical document and it is intended as [id number] communication between your medical providers. This medical document may contain abbreviations, assessments, medical data, and results or other terms that are unfamiliar. Medical documents are intended to carry relevant information, facts as evident, and the clinical opinion of the practitioner. As such, this medical document may be written in language that appears blunt or direct. You are encouraged to contact your medical provider and/or [facility] Patient Experience if you have any questions about this medical document.

## (undated) NOTE — LETTER
02/12/20        [address] [place] [address]      Dear Melina Winston,    [facility] records indicate that you have outstanding lab work and or testing that was ordered for you and has not yet been completed:  Orders Placed This En

## (undated) NOTE — LETTER
08/26/20        [address]  [address] [facility] [address]      Dear Haile Augustin,    [address] records indicate that you have outstanding lab work and or testing that was ordered for you and has not yet been completed:  Orders Placed This En

## (undated) NOTE — MR AVS SNAPSHOT
[facility] [place] [address] [facility], [address] [place] [address]                    After Visit Summary   5/30/2017    Alex Pod    MRN: [id number]           Visit Information        Provider Department Dept Phone    5 For medical emergencies, dial 911.

## (undated) NOTE — Clinical Note
Hazel Moore - I saw Haydee Callejasy today with prolapse. I've recommended bladder testing. I will work to manage her sx. I appreciate the opportunity to participate in her care.  Thanks, [employer]